# Patient Record
Sex: MALE | Race: BLACK OR AFRICAN AMERICAN | Employment: UNEMPLOYED
[De-identification: names, ages, dates, MRNs, and addresses within clinical notes are randomized per-mention and may not be internally consistent; named-entity substitution may affect disease eponyms.]

---

## 2017-01-01 ENCOUNTER — HOME CARE VISIT (OUTPATIENT)
Dept: SCHEDULING | Facility: HOME HEALTH | Age: 57
End: 2017-01-01
Payer: MEDICARE

## 2017-01-01 ENCOUNTER — HOME CARE VISIT (OUTPATIENT)
Dept: HOSPICE | Facility: HOSPICE | Age: 57
End: 2017-01-01
Payer: MEDICARE

## 2017-01-01 ENCOUNTER — HOSPICE CERTIFICATION ENCOUNTER (OUTPATIENT)
Dept: PALLATIVE CARE | Age: 57
End: 2017-01-01

## 2017-01-01 ENCOUNTER — HOSPITAL ENCOUNTER (INPATIENT)
Age: 57
LOS: 5 days | Discharge: HOSPICE | End: 2017-05-22
Attending: INTERNAL MEDICINE | Admitting: INTERNAL MEDICINE
Payer: MEDICARE

## 2017-01-01 ENCOUNTER — HOSPITAL ENCOUNTER (OUTPATIENT)
Dept: INTERVENTIONAL RADIOLOGY/VASCULAR | Age: 57
Discharge: HOME | End: 2017-01-04
Attending: INTERNAL MEDICINE | Admitting: INTERNAL MEDICINE
Payer: MEDICARE

## 2017-01-01 ENCOUNTER — NURSE NAVIGATOR (OUTPATIENT)
Dept: PALLATIVE CARE | Age: 57
End: 2017-01-01

## 2017-01-01 ENCOUNTER — HOSPITAL ENCOUNTER (INPATIENT)
Age: 57
LOS: 2 days | DRG: 951 | End: 2017-06-23
Attending: INTERNAL MEDICINE | Admitting: INTERNAL MEDICINE
Payer: OTHER MISCELLANEOUS

## 2017-01-01 ENCOUNTER — HOME CARE VISIT (OUTPATIENT)
Dept: HOME HEALTH SERVICES | Facility: HOME HEALTH | Age: 57
End: 2017-01-01

## 2017-01-01 VITALS
SYSTOLIC BLOOD PRESSURE: 133 MMHG | OXYGEN SATURATION: 96 % | RESPIRATION RATE: 21 BRPM | HEART RATE: 86 BPM | DIASTOLIC BLOOD PRESSURE: 96 MMHG

## 2017-01-01 VITALS — RESPIRATION RATE: 17 BRPM | HEART RATE: 74 BPM | DIASTOLIC BLOOD PRESSURE: 66 MMHG | SYSTOLIC BLOOD PRESSURE: 107 MMHG

## 2017-01-01 VITALS
RESPIRATION RATE: 20 BRPM | HEART RATE: 91 BPM | SYSTOLIC BLOOD PRESSURE: 112 MMHG | DIASTOLIC BLOOD PRESSURE: 50 MMHG | TEMPERATURE: 98 F

## 2017-01-01 VITALS
DIASTOLIC BLOOD PRESSURE: 75 MMHG | RESPIRATION RATE: 19 BRPM | HEART RATE: 63 BPM | OXYGEN SATURATION: 93 % | SYSTOLIC BLOOD PRESSURE: 102 MMHG

## 2017-01-01 VITALS
SYSTOLIC BLOOD PRESSURE: 95 MMHG | OXYGEN SATURATION: 94 % | DIASTOLIC BLOOD PRESSURE: 72 MMHG | HEART RATE: 68 BPM | SYSTOLIC BLOOD PRESSURE: 130 MMHG | RESPIRATION RATE: 18 BRPM | DIASTOLIC BLOOD PRESSURE: 69 MMHG | HEART RATE: 96 BPM

## 2017-01-01 VITALS
DIASTOLIC BLOOD PRESSURE: 93 MMHG | HEART RATE: 51 BPM | RESPIRATION RATE: 20 BRPM | SYSTOLIC BLOOD PRESSURE: 118 MMHG | OXYGEN SATURATION: 98 %

## 2017-01-01 VITALS
HEART RATE: 91 BPM | DIASTOLIC BLOOD PRESSURE: 52 MMHG | RESPIRATION RATE: 21 BRPM | OXYGEN SATURATION: 99 % | SYSTOLIC BLOOD PRESSURE: 110 MMHG

## 2017-01-01 VITALS
SYSTOLIC BLOOD PRESSURE: 131 MMHG | DIASTOLIC BLOOD PRESSURE: 80 MMHG | HEART RATE: 84 BPM | OXYGEN SATURATION: 96 % | RESPIRATION RATE: 19 BRPM

## 2017-01-01 VITALS
OXYGEN SATURATION: 95 % | DIASTOLIC BLOOD PRESSURE: 75 MMHG | HEART RATE: 93 BPM | RESPIRATION RATE: 16 BRPM | SYSTOLIC BLOOD PRESSURE: 105 MMHG

## 2017-01-01 VITALS
HEART RATE: 109 BPM | DIASTOLIC BLOOD PRESSURE: 54 MMHG | HEART RATE: 62 BPM | RESPIRATION RATE: 21 BRPM | RESPIRATION RATE: 20 BRPM | DIASTOLIC BLOOD PRESSURE: 55 MMHG | SYSTOLIC BLOOD PRESSURE: 93 MMHG | OXYGEN SATURATION: 94 % | OXYGEN SATURATION: 91 % | SYSTOLIC BLOOD PRESSURE: 98 MMHG

## 2017-01-01 VITALS
RESPIRATION RATE: 21 BRPM | DIASTOLIC BLOOD PRESSURE: 65 MMHG | OXYGEN SATURATION: 95 % | HEART RATE: 88 BPM | SYSTOLIC BLOOD PRESSURE: 139 MMHG

## 2017-01-01 VITALS
SYSTOLIC BLOOD PRESSURE: 130 MMHG | DIASTOLIC BLOOD PRESSURE: 89 MMHG | HEART RATE: 54 BPM | OXYGEN SATURATION: 91 % | RESPIRATION RATE: 12 BRPM

## 2017-01-01 VITALS
HEART RATE: 89 BPM | OXYGEN SATURATION: 98 % | TEMPERATURE: 98 F | SYSTOLIC BLOOD PRESSURE: 100 MMHG | RESPIRATION RATE: 21 BRPM | DIASTOLIC BLOOD PRESSURE: 69 MMHG

## 2017-01-01 VITALS — RESPIRATION RATE: 21 BRPM | DIASTOLIC BLOOD PRESSURE: 54 MMHG | SYSTOLIC BLOOD PRESSURE: 91 MMHG | HEART RATE: 70 BPM

## 2017-01-01 VITALS
HEART RATE: 77 BPM | SYSTOLIC BLOOD PRESSURE: 81 MMHG | RESPIRATION RATE: 20 BRPM | DIASTOLIC BLOOD PRESSURE: 55 MMHG | OXYGEN SATURATION: 97 %

## 2017-01-01 VITALS — SYSTOLIC BLOOD PRESSURE: 87 MMHG | HEART RATE: 70 BPM | RESPIRATION RATE: 22 BRPM | DIASTOLIC BLOOD PRESSURE: 54 MMHG

## 2017-01-01 VITALS
OXYGEN SATURATION: 97 % | SYSTOLIC BLOOD PRESSURE: 123 MMHG | RESPIRATION RATE: 20 BRPM | DIASTOLIC BLOOD PRESSURE: 88 MMHG | HEART RATE: 87 BPM

## 2017-01-01 VITALS — HEART RATE: 70 BPM | SYSTOLIC BLOOD PRESSURE: 90 MMHG | DIASTOLIC BLOOD PRESSURE: 62 MMHG | OXYGEN SATURATION: 98 %

## 2017-01-01 VITALS
SYSTOLIC BLOOD PRESSURE: 93 MMHG | HEART RATE: 75 BPM | OXYGEN SATURATION: 91 % | RESPIRATION RATE: 21 BRPM | DIASTOLIC BLOOD PRESSURE: 46 MMHG

## 2017-01-01 VITALS
TEMPERATURE: 98.8 F | RESPIRATION RATE: 18 BRPM | SYSTOLIC BLOOD PRESSURE: 91 MMHG | OXYGEN SATURATION: 90 % | HEART RATE: 100 BPM | DIASTOLIC BLOOD PRESSURE: 64 MMHG

## 2017-01-01 VITALS
SYSTOLIC BLOOD PRESSURE: 90 MMHG | OXYGEN SATURATION: 91 % | RESPIRATION RATE: 20 BRPM | DIASTOLIC BLOOD PRESSURE: 59 MMHG | HEART RATE: 84 BPM

## 2017-01-01 VITALS
OXYGEN SATURATION: 98 % | RESPIRATION RATE: 20 BRPM | TEMPERATURE: 98 F | SYSTOLIC BLOOD PRESSURE: 117 MMHG | DIASTOLIC BLOOD PRESSURE: 80 MMHG | HEART RATE: 92 BPM

## 2017-01-01 VITALS
RESPIRATION RATE: 21 BRPM | OXYGEN SATURATION: 87 % | HEART RATE: 78 BPM | DIASTOLIC BLOOD PRESSURE: 95 MMHG | SYSTOLIC BLOOD PRESSURE: 131 MMHG

## 2017-01-01 VITALS
HEART RATE: 58 BPM | RESPIRATION RATE: 18 BRPM | OXYGEN SATURATION: 97 % | TEMPERATURE: 98.2 F | SYSTOLIC BLOOD PRESSURE: 117 MMHG | DIASTOLIC BLOOD PRESSURE: 84 MMHG

## 2017-01-01 VITALS
RESPIRATION RATE: 20 BRPM | OXYGEN SATURATION: 93 % | DIASTOLIC BLOOD PRESSURE: 57 MMHG | HEART RATE: 71 BPM | SYSTOLIC BLOOD PRESSURE: 85 MMHG

## 2017-01-01 VITALS
DIASTOLIC BLOOD PRESSURE: 65 MMHG | SYSTOLIC BLOOD PRESSURE: 113 MMHG | OXYGEN SATURATION: 82 % | HEART RATE: 77 BPM | RESPIRATION RATE: 22 BRPM

## 2017-01-01 VITALS
SYSTOLIC BLOOD PRESSURE: 96 MMHG | RESPIRATION RATE: 13 BRPM | DIASTOLIC BLOOD PRESSURE: 58 MMHG | OXYGEN SATURATION: 96 % | HEART RATE: 113 BPM

## 2017-01-01 VITALS
SYSTOLIC BLOOD PRESSURE: 125 MMHG | DIASTOLIC BLOOD PRESSURE: 95 MMHG | OXYGEN SATURATION: 98 % | HEART RATE: 82 BPM | RESPIRATION RATE: 21 BRPM

## 2017-01-01 VITALS
DIASTOLIC BLOOD PRESSURE: 92 MMHG | OXYGEN SATURATION: 98 % | SYSTOLIC BLOOD PRESSURE: 111 MMHG | RESPIRATION RATE: 21 BRPM | HEART RATE: 75 BPM

## 2017-01-01 VITALS — DIASTOLIC BLOOD PRESSURE: 95 MMHG | SYSTOLIC BLOOD PRESSURE: 110 MMHG

## 2017-01-01 VITALS
HEART RATE: 80 BPM | SYSTOLIC BLOOD PRESSURE: 88 MMHG | DIASTOLIC BLOOD PRESSURE: 52 MMHG | OXYGEN SATURATION: 92 % | RESPIRATION RATE: 16 BRPM

## 2017-01-01 VITALS
OXYGEN SATURATION: 97 % | SYSTOLIC BLOOD PRESSURE: 106 MMHG | HEART RATE: 71 BPM | RESPIRATION RATE: 17 BRPM | DIASTOLIC BLOOD PRESSURE: 58 MMHG

## 2017-01-01 VITALS — DIASTOLIC BLOOD PRESSURE: 91 MMHG | RESPIRATION RATE: 20 BRPM | HEART RATE: 81 BPM | SYSTOLIC BLOOD PRESSURE: 129 MMHG

## 2017-01-01 VITALS
WEIGHT: 190 LBS | HEART RATE: 77 BPM | RESPIRATION RATE: 18 BRPM | BODY MASS INDEX: 25.73 KG/M2 | SYSTOLIC BLOOD PRESSURE: 93 MMHG | OXYGEN SATURATION: 99 % | HEIGHT: 72 IN | DIASTOLIC BLOOD PRESSURE: 74 MMHG | TEMPERATURE: 98.2 F

## 2017-01-01 VITALS
HEART RATE: 77 BPM | RESPIRATION RATE: 16 BRPM | SYSTOLIC BLOOD PRESSURE: 99 MMHG | DIASTOLIC BLOOD PRESSURE: 70 MMHG | OXYGEN SATURATION: 98 %

## 2017-01-01 VITALS — SYSTOLIC BLOOD PRESSURE: 89 MMHG | OXYGEN SATURATION: 93 % | DIASTOLIC BLOOD PRESSURE: 59 MMHG | HEART RATE: 59 BPM

## 2017-01-01 VITALS
HEART RATE: 85 BPM | OXYGEN SATURATION: 98 % | DIASTOLIC BLOOD PRESSURE: 98 MMHG | RESPIRATION RATE: 18 BRPM | TEMPERATURE: 98 F | SYSTOLIC BLOOD PRESSURE: 142 MMHG

## 2017-01-01 VITALS — DIASTOLIC BLOOD PRESSURE: 82 MMHG | HEART RATE: 66 BPM | RESPIRATION RATE: 12 BRPM | SYSTOLIC BLOOD PRESSURE: 110 MMHG

## 2017-01-01 VITALS
OXYGEN SATURATION: 92 % | DIASTOLIC BLOOD PRESSURE: 90 MMHG | RESPIRATION RATE: 24 BRPM | SYSTOLIC BLOOD PRESSURE: 131 MMHG | HEART RATE: 91 BPM

## 2017-01-01 VITALS — HEART RATE: 98 BPM | SYSTOLIC BLOOD PRESSURE: 85 MMHG | OXYGEN SATURATION: 98 % | DIASTOLIC BLOOD PRESSURE: 49 MMHG

## 2017-01-01 DIAGNOSIS — I25.9 ISCHEMIC HEART DISEASE DUE TO CORONARY ARTERY OBSTRUCTION (HCC): Primary | ICD-10-CM

## 2017-01-01 DIAGNOSIS — G89.4 CHRONIC PAIN DISORDER: ICD-10-CM

## 2017-01-01 DIAGNOSIS — I24.0 ISCHEMIC HEART DISEASE DUE TO CORONARY ARTERY OBSTRUCTION (HCC): Primary | ICD-10-CM

## 2017-01-01 DIAGNOSIS — I50.9 CHF (CONGESTIVE HEART FAILURE) (HCC): ICD-10-CM

## 2017-01-01 PROCEDURE — 0651 HSPC ROUTINE HOME CARE

## 2017-01-01 PROCEDURE — G0299 HHS/HOSPICE OF RN EA 15 MIN: HCPCS

## 2017-01-01 PROCEDURE — HOSPICE MEDICATION HC HH HOSPICE MEDICATION

## 2017-01-01 PROCEDURE — 74011636637 HC RX REV CODE- 636/637: Performed by: INTERNAL MEDICINE

## 2017-01-01 PROCEDURE — C1751 CATH, INF, PER/CENT/MIDLINE: HCPCS

## 2017-01-01 PROCEDURE — 74011250637 HC RX REV CODE- 250/637: Performed by: INTERNAL MEDICINE

## 2017-01-01 PROCEDURE — 77010033678 HC OXYGEN DAILY

## 2017-01-01 PROCEDURE — 0655 HSPC INPATIENT RESPITE

## 2017-01-01 PROCEDURE — G0155 HHCP-SVS OF CSW,EA 15 MIN: HCPCS

## 2017-01-01 PROCEDURE — 51798 US URINE CAPACITY MEASURE: CPT

## 2017-01-01 PROCEDURE — 74011000250 HC RX REV CODE- 250

## 2017-01-01 PROCEDURE — 36569 INSJ PICC 5 YR+ W/O IMAGING: CPT

## 2017-01-01 PROCEDURE — 65270000015 HC RM PRIVATE ONCOLOGY

## 2017-01-01 PROCEDURE — 77030002996 HC SUT SLK J&J -A

## 2017-01-01 PROCEDURE — 74011250637 HC RX REV CODE- 250/637: Performed by: NURSE PRACTITIONER

## 2017-01-01 PROCEDURE — 74011250636 HC RX REV CODE- 250/636

## 2017-01-01 PROCEDURE — 74011250636 HC RX REV CODE- 250/636: Performed by: INTERNAL MEDICINE

## 2017-01-01 RX ORDER — LORAZEPAM 1 MG/1
1 TABLET ORAL
Status: DISCONTINUED | OUTPATIENT
Start: 2017-01-01 | End: 2017-01-01 | Stop reason: HOSPADM

## 2017-01-01 RX ORDER — HYDROMORPHONE HYDROCHLORIDE 2 MG/ML
INJECTION, SOLUTION INTRAMUSCULAR; INTRAVENOUS; SUBCUTANEOUS
Status: DISPENSED
Start: 2017-01-01 | End: 2017-01-01

## 2017-01-01 RX ORDER — PREDNISONE 20 MG/1
20 TABLET ORAL DAILY
Qty: 30 TAB | Refills: 2 | Status: SHIPPED | OUTPATIENT
Start: 2017-01-01

## 2017-01-01 RX ORDER — OXYCODONE HCL 20 MG/ML
60 CONCENTRATE, ORAL ORAL
Status: DISCONTINUED | OUTPATIENT
Start: 2017-01-01 | End: 2017-01-01 | Stop reason: HOSPADM

## 2017-01-01 RX ORDER — LORAZEPAM 2 MG/ML
0.5 CONCENTRATE ORAL 2 TIMES DAILY
Status: CANCELLED | OUTPATIENT
Start: 2017-01-01

## 2017-01-01 RX ORDER — METHADONE HYDROCHLORIDE 5 MG/1
5 TABLET ORAL 3 TIMES DAILY
Status: DISCONTINUED | OUTPATIENT
Start: 2017-01-01 | End: 2017-01-01 | Stop reason: HOSPADM

## 2017-01-01 RX ORDER — LISINOPRIL 2.5 MG/1
2.5 TABLET ORAL DAILY
Status: DISCONTINUED | OUTPATIENT
Start: 2017-01-01 | End: 2017-01-01

## 2017-01-01 RX ORDER — FACIAL-BODY WIPES
10 EACH TOPICAL DAILY PRN
Status: DISCONTINUED | OUTPATIENT
Start: 2017-01-01 | End: 2017-01-01 | Stop reason: HOSPADM

## 2017-01-01 RX ORDER — METHADONE HYDROCHLORIDE 5 MG/1
5 TABLET ORAL 2 TIMES DAILY
Status: DISCONTINUED | OUTPATIENT
Start: 2017-01-01 | End: 2017-01-01

## 2017-01-01 RX ORDER — MORPHINE SULFATE 20 MG/ML
50 SOLUTION ORAL ONCE
Status: COMPLETED | OUTPATIENT
Start: 2017-01-01 | End: 2017-01-01

## 2017-01-01 RX ORDER — SERTRALINE HYDROCHLORIDE 50 MG/1
50 TABLET, FILM COATED ORAL DAILY
Status: DISCONTINUED | OUTPATIENT
Start: 2017-01-01 | End: 2017-01-01 | Stop reason: HOSPADM

## 2017-01-01 RX ORDER — BUMETANIDE 1 MG/1
2 TABLET ORAL DAILY
Status: DISCONTINUED | OUTPATIENT
Start: 2017-01-01 | End: 2017-01-01

## 2017-01-01 RX ORDER — DIGOXIN 125 MCG
0.25 TABLET ORAL DAILY
Status: DISCONTINUED | OUTPATIENT
Start: 2017-01-01 | End: 2017-01-01 | Stop reason: HOSPADM

## 2017-01-01 RX ORDER — LORAZEPAM 0.5 MG/1
0.5 TABLET ORAL EVERY 12 HOURS
Status: DISCONTINUED | OUTPATIENT
Start: 2017-01-01 | End: 2017-01-01 | Stop reason: HOSPADM

## 2017-01-01 RX ORDER — GUAIFENESIN 100 MG/5ML
81 LIQUID (ML) ORAL DAILY
Status: DISCONTINUED | OUTPATIENT
Start: 2017-01-01 | End: 2017-01-01 | Stop reason: HOSPADM

## 2017-01-01 RX ORDER — OXYCODONE HYDROCHLORIDE 30 MG/1
60 TABLET ORAL EVERY 4 HOURS
Status: DISCONTINUED | OUTPATIENT
Start: 2017-01-01 | End: 2017-01-01 | Stop reason: HOSPADM

## 2017-01-01 RX ORDER — DIGOXIN 125 MCG
250 TABLET ORAL DAILY
Status: DISCONTINUED | OUTPATIENT
Start: 2017-01-01 | End: 2017-01-01 | Stop reason: HOSPADM

## 2017-01-01 RX ORDER — LORAZEPAM 2 MG/ML
1 CONCENTRATE ORAL
Status: DISCONTINUED | OUTPATIENT
Start: 2017-01-01 | End: 2017-01-01 | Stop reason: HOSPADM

## 2017-01-01 RX ORDER — TAMSULOSIN HYDROCHLORIDE 0.4 MG/1
0.4 CAPSULE ORAL DAILY
Status: DISCONTINUED | OUTPATIENT
Start: 2017-01-01 | End: 2017-01-01 | Stop reason: HOSPADM

## 2017-01-01 RX ORDER — ACETAMINOPHEN 650 MG/1
650 SUPPOSITORY RECTAL
Status: DISCONTINUED | OUTPATIENT
Start: 2017-01-01 | End: 2017-01-01 | Stop reason: HOSPADM

## 2017-01-01 RX ORDER — DEXTROMETHORPHAN POLISTIREX 30 MG/5 ML
SUSPENSION, EXTENDED RELEASE 12 HR ORAL AS NEEDED
Status: DISCONTINUED | OUTPATIENT
Start: 2017-01-01 | End: 2017-01-01 | Stop reason: HOSPADM

## 2017-01-01 RX ORDER — LISINOPRIL 5 MG/1
2.5 TABLET ORAL DAILY
Status: DISCONTINUED | OUTPATIENT
Start: 2017-01-01 | End: 2017-01-01 | Stop reason: SDUPTHER

## 2017-01-01 RX ORDER — HYDROMORPHONE HYDROCHLORIDE 2 MG/1
2 TABLET ORAL
Status: DISCONTINUED | OUTPATIENT
Start: 2017-01-01 | End: 2017-01-01 | Stop reason: HOSPADM

## 2017-01-01 RX ORDER — MORPHINE SULFATE 20 MG/ML
10 SOLUTION ORAL
Status: DISCONTINUED | OUTPATIENT
Start: 2017-01-01 | End: 2017-01-01

## 2017-01-01 RX ORDER — OXYCODONE HYDROCHLORIDE 30 MG/1
30 TABLET ORAL
Status: DISCONTINUED | OUTPATIENT
Start: 2017-01-01 | End: 2017-01-01

## 2017-01-01 RX ORDER — CARVEDILOL 3.12 MG/1
3.12 TABLET ORAL 2 TIMES DAILY WITH MEALS
Status: DISCONTINUED | OUTPATIENT
Start: 2017-01-01 | End: 2017-01-01

## 2017-01-01 RX ORDER — NITROGLYCERIN 0.4 MG/1
0.4 TABLET SUBLINGUAL AS NEEDED
Status: DISCONTINUED | OUTPATIENT
Start: 2017-01-01 | End: 2017-01-01 | Stop reason: HOSPADM

## 2017-01-01 RX ORDER — AMOXICILLIN 250 MG
2 CAPSULE ORAL 2 TIMES DAILY
Status: DISCONTINUED | OUTPATIENT
Start: 2017-01-01 | End: 2017-01-01 | Stop reason: HOSPADM

## 2017-01-01 RX ORDER — LORAZEPAM 2 MG/ML
0.5 CONCENTRATE ORAL EVERY 6 HOURS
Status: DISCONTINUED | OUTPATIENT
Start: 2017-01-01 | End: 2017-01-01 | Stop reason: HOSPADM

## 2017-01-01 RX ORDER — MORPHINE SULFATE 20 MG/ML
60 SOLUTION ORAL
Status: DISCONTINUED | OUTPATIENT
Start: 2017-01-01 | End: 2017-01-01

## 2017-01-01 RX ORDER — ONDANSETRON 4 MG/1
4 TABLET, ORALLY DISINTEGRATING ORAL
Status: DISCONTINUED | OUTPATIENT
Start: 2017-01-01 | End: 2017-01-01 | Stop reason: HOSPADM

## 2017-01-01 RX ORDER — BUMETANIDE 1 MG/1
1 TABLET ORAL 2 TIMES DAILY
Status: DISCONTINUED | OUTPATIENT
Start: 2017-01-01 | End: 2017-01-01 | Stop reason: HOSPADM

## 2017-01-01 RX ORDER — PREDNISONE 10 MG/1
20 TABLET ORAL
Status: DISCONTINUED | OUTPATIENT
Start: 2017-01-01 | End: 2017-01-01 | Stop reason: HOSPADM

## 2017-01-01 RX ORDER — OXYCODONE HYDROCHLORIDE 5 MG/1
30 TABLET ORAL
Status: DISCONTINUED | OUTPATIENT
Start: 2017-01-01 | End: 2017-01-01 | Stop reason: SDUPTHER

## 2017-01-01 RX ORDER — SULINDAC 150 MG/1
150 TABLET ORAL 2 TIMES DAILY WITH MEALS
Status: DISCONTINUED | OUTPATIENT
Start: 2017-01-01 | End: 2017-01-01

## 2017-01-01 RX ORDER — SULINDAC 200 MG/1
200 TABLET ORAL 2 TIMES DAILY WITH MEALS
Status: DISCONTINUED | OUTPATIENT
Start: 2017-01-01 | End: 2017-01-01 | Stop reason: HOSPADM

## 2017-01-01 RX ORDER — LIDOCAINE HYDROCHLORIDE 20 MG/ML
INJECTION, SOLUTION INFILTRATION; PERINEURAL
Status: COMPLETED
Start: 2017-01-01 | End: 2017-01-01

## 2017-01-01 RX ADMIN — MINERAL OIL: 100 ENEMA RECTAL at 08:25

## 2017-01-01 RX ADMIN — DIGOXIN 0.25 MG: 125 TABLET ORAL at 08:49

## 2017-01-01 RX ADMIN — SULINDAC 200 MG: 200 TABLET ORAL at 17:28

## 2017-01-01 RX ADMIN — ASPIRIN 81 MG: 81 TABLET, CHEWABLE ORAL at 08:49

## 2017-01-01 RX ADMIN — OXYCODONE HYDROCHLORIDE 60 MG: 100 SOLUTION ORAL at 09:49

## 2017-01-01 RX ADMIN — OXYCODONE HYDROCHLORIDE 60 MG: 30 TABLET ORAL at 14:30

## 2017-01-01 RX ADMIN — OXYCODONE HYDROCHLORIDE 60 MG: 30 TABLET ORAL at 14:58

## 2017-01-01 RX ADMIN — OXYCODONE HYDROCHLORIDE 60 MG: 30 TABLET ORAL at 14:00

## 2017-01-01 RX ADMIN — METHADONE HYDROCHLORIDE 5 MG: 5 TABLET ORAL at 17:06

## 2017-01-01 RX ADMIN — METHADONE HYDROCHLORIDE 5 MG: 5 TABLET ORAL at 08:50

## 2017-01-01 RX ADMIN — OXYCODONE HYDROCHLORIDE 60 MG: 30 TABLET ORAL at 23:00

## 2017-01-01 RX ADMIN — LORAZEPAM 0.5 MG: 2 SOLUTION, CONCENTRATE ORAL at 23:32

## 2017-01-01 RX ADMIN — OXYCODONE HYDROCHLORIDE 60 MG: 100 SOLUTION ORAL at 23:31

## 2017-01-01 RX ADMIN — OXYCODONE HYDROCHLORIDE 60 MG: 30 TABLET ORAL at 03:30

## 2017-01-01 RX ADMIN — TAMSULOSIN HYDROCHLORIDE 0.4 MG: 0.4 CAPSULE ORAL at 09:15

## 2017-01-01 RX ADMIN — SULINDAC 200 MG: 200 TABLET ORAL at 17:57

## 2017-01-01 RX ADMIN — OXYCODONE HYDROCHLORIDE 60 MG: 30 TABLET ORAL at 07:00

## 2017-01-01 RX ADMIN — TAMSULOSIN HYDROCHLORIDE 0.4 MG: 0.4 CAPSULE ORAL at 08:49

## 2017-01-01 RX ADMIN — ASPIRIN 81 MG: 81 TABLET, CHEWABLE ORAL at 08:48

## 2017-01-01 RX ADMIN — TAMSULOSIN HYDROCHLORIDE 0.4 MG: 0.4 CAPSULE ORAL at 08:15

## 2017-01-01 RX ADMIN — LORAZEPAM 0.5 MG: 2 SOLUTION, CONCENTRATE ORAL at 00:56

## 2017-01-01 RX ADMIN — SULINDAC 200 MG: 200 TABLET ORAL at 08:25

## 2017-01-01 RX ADMIN — STANDARDIZED SENNA CONCENTRATE AND DOCUSATE SODIUM 2 TABLET: 8.6; 5 TABLET, FILM COATED ORAL at 17:43

## 2017-01-01 RX ADMIN — OXYCODONE HYDROCHLORIDE 60 MG: 30 TABLET ORAL at 16:42

## 2017-01-01 RX ADMIN — LORAZEPAM 0.5 MG: 2 SOLUTION, CONCENTRATE ORAL at 05:23

## 2017-01-01 RX ADMIN — CARVEDILOL 3.12 MG: 3.12 TABLET, FILM COATED ORAL at 08:48

## 2017-01-01 RX ADMIN — SERTRALINE HYDROCHLORIDE 50 MG: 50 TABLET ORAL at 08:12

## 2017-01-01 RX ADMIN — BUMETANIDE 2 MG: 1 TABLET ORAL at 08:49

## 2017-01-01 RX ADMIN — PREDNISONE 20 MG: 10 TABLET ORAL at 08:00

## 2017-01-01 RX ADMIN — OXYCODONE HYDROCHLORIDE 60 MG: 30 TABLET ORAL at 03:00

## 2017-01-01 RX ADMIN — METHADONE HYDROCHLORIDE 5 MG: 5 TABLET ORAL at 17:53

## 2017-01-01 RX ADMIN — LORAZEPAM 0.5 MG: 0.5 TABLET ORAL at 08:12

## 2017-01-01 RX ADMIN — DIGOXIN 0.25 MG: 125 TABLET ORAL at 08:14

## 2017-01-01 RX ADMIN — MORPHINE SULFATE 50 MG: 20 SOLUTION ORAL at 11:04

## 2017-01-01 RX ADMIN — ONDANSETRON 4 MG: 4 TABLET, ORALLY DISINTEGRATING ORAL at 23:20

## 2017-01-01 RX ADMIN — SULINDAC 200 MG: 200 TABLET ORAL at 17:06

## 2017-01-01 RX ADMIN — METHADONE HYDROCHLORIDE 5 MG: 5 TABLET ORAL at 17:57

## 2017-01-01 RX ADMIN — LORAZEPAM 0.5 MG: 2 SOLUTION, CONCENTRATE ORAL at 11:10

## 2017-01-01 RX ADMIN — OXYCODONE HYDROCHLORIDE 60 MG: 30 TABLET ORAL at 10:21

## 2017-01-01 RX ADMIN — MORPHINE SULFATE 10 MG: 20 SOLUTION ORAL at 13:29

## 2017-01-01 RX ADMIN — SULINDAC 200 MG: 200 TABLET ORAL at 09:16

## 2017-01-01 RX ADMIN — LISINOPRIL 2.5 MG: 5 TABLET ORAL at 08:49

## 2017-01-01 RX ADMIN — OXYCODONE HYDROCHLORIDE 60 MG: 30 TABLET ORAL at 06:59

## 2017-01-01 RX ADMIN — SULINDAC 200 MG: 200 TABLET ORAL at 17:44

## 2017-01-01 RX ADMIN — OXYCODONE HYDROCHLORIDE 60 MG: 30 TABLET ORAL at 12:10

## 2017-01-01 RX ADMIN — LIDOCAINE HYDROCHLORIDE: 20 INJECTION, SOLUTION INFILTRATION; PERINEURAL at 08:00

## 2017-01-01 RX ADMIN — SULINDAC 200 MG: 200 TABLET ORAL at 08:56

## 2017-01-01 RX ADMIN — CARVEDILOL 3.12 MG: 3.12 TABLET, FILM COATED ORAL at 17:53

## 2017-01-01 RX ADMIN — PREDNISONE 20 MG: 10 TABLET ORAL at 08:51

## 2017-01-01 RX ADMIN — DIGOXIN 0.25 MG: 125 TABLET ORAL at 08:16

## 2017-01-01 RX ADMIN — STANDARDIZED SENNA CONCENTRATE AND DOCUSATE SODIUM 2 TABLET: 8.6; 5 TABLET, FILM COATED ORAL at 17:05

## 2017-01-01 RX ADMIN — LORAZEPAM 1 MG: 1 TABLET ORAL at 08:33

## 2017-01-01 RX ADMIN — STANDARDIZED SENNA CONCENTRATE AND DOCUSATE SODIUM 2 TABLET: 8.6; 5 TABLET, FILM COATED ORAL at 09:00

## 2017-01-01 RX ADMIN — ACETAMINOPHEN 650 MG: 650 SUPPOSITORY RECTAL at 09:49

## 2017-01-01 RX ADMIN — PREDNISONE 20 MG: 10 TABLET ORAL at 08:26

## 2017-01-01 RX ADMIN — LORAZEPAM 0.5 MG: 0.5 TABLET ORAL at 08:50

## 2017-01-01 RX ADMIN — CARVEDILOL 3.12 MG: 3.12 TABLET, FILM COATED ORAL at 17:30

## 2017-01-01 RX ADMIN — OXYCODONE HYDROCHLORIDE 60 MG: 30 TABLET ORAL at 19:00

## 2017-01-01 RX ADMIN — PREDNISONE 20 MG: 10 TABLET ORAL at 08:15

## 2017-01-01 RX ADMIN — METHADONE HYDROCHLORIDE 5 MG: 5 TABLET ORAL at 21:23

## 2017-01-01 RX ADMIN — METHADONE HYDROCHLORIDE 5 MG: 5 TABLET ORAL at 08:48

## 2017-01-01 RX ADMIN — ONDANSETRON 4 MG: 4 TABLET, ORALLY DISINTEGRATING ORAL at 09:08

## 2017-01-01 RX ADMIN — TAMSULOSIN HYDROCHLORIDE 0.4 MG: 0.4 CAPSULE ORAL at 08:55

## 2017-01-01 RX ADMIN — SERTRALINE HYDROCHLORIDE 50 MG: 50 TABLET ORAL at 08:19

## 2017-01-01 RX ADMIN — SULINDAC 200 MG: 200 TABLET ORAL at 08:13

## 2017-01-01 RX ADMIN — OXYCODONE HYDROCHLORIDE 30 MG: 5 TABLET ORAL at 23:42

## 2017-01-01 RX ADMIN — TAMSULOSIN HYDROCHLORIDE 0.4 MG: 0.4 CAPSULE ORAL at 08:11

## 2017-01-01 RX ADMIN — OXYCODONE HYDROCHLORIDE 30 MG: 5 TABLET ORAL at 05:26

## 2017-01-01 RX ADMIN — SERTRALINE HYDROCHLORIDE 50 MG: 50 TABLET ORAL at 09:16

## 2017-01-01 RX ADMIN — LORAZEPAM 0.5 MG: 0.5 TABLET ORAL at 20:50

## 2017-01-01 RX ADMIN — BISACODYL 10 MG: 10 SUPPOSITORY RECTAL at 17:45

## 2017-01-01 RX ADMIN — STANDARDIZED SENNA CONCENTRATE AND DOCUSATE SODIUM 2 TABLET: 8.6; 5 TABLET, FILM COATED ORAL at 08:15

## 2017-01-01 RX ADMIN — CARVEDILOL 3.12 MG: 3.12 TABLET, FILM COATED ORAL at 17:57

## 2017-01-01 RX ADMIN — PREDNISONE 20 MG: 10 TABLET ORAL at 09:14

## 2017-01-01 RX ADMIN — HYDROMORPHONE HYDROCHLORIDE 1 MG/HR: 2 INJECTION INTRAMUSCULAR; INTRAVENOUS; SUBCUTANEOUS at 20:18

## 2017-01-01 RX ADMIN — ASPIRIN 81 MG: 81 TABLET, CHEWABLE ORAL at 08:24

## 2017-01-01 RX ADMIN — SERTRALINE HYDROCHLORIDE 50 MG: 50 TABLET ORAL at 09:00

## 2017-01-01 RX ADMIN — METHADONE HYDROCHLORIDE 5 MG: 5 TABLET ORAL at 08:14

## 2017-01-01 RX ADMIN — SODIUM BICARBONATE: 0.2 INJECTION, SOLUTION INTRAVENOUS at 08:00

## 2017-01-01 RX ADMIN — ASPIRIN 81 MG: 81 TABLET, CHEWABLE ORAL at 09:13

## 2017-01-01 RX ADMIN — SERTRALINE HYDROCHLORIDE 50 MG: 50 TABLET ORAL at 08:55

## 2017-01-01 RX ADMIN — OXYCODONE HYDROCHLORIDE 60 MG: 30 TABLET ORAL at 23:21

## 2017-01-01 RX ADMIN — OXYCODONE HYDROCHLORIDE 60 MG: 30 TABLET ORAL at 14:18

## 2017-01-01 RX ADMIN — MORPHINE SULFATE 10 MG: 20 SOLUTION ORAL at 09:52

## 2017-01-01 RX ADMIN — OXYCODONE HYDROCHLORIDE 60 MG: 30 TABLET ORAL at 11:46

## 2017-01-01 RX ADMIN — OXYCODONE HYDROCHLORIDE 60 MG: 100 SOLUTION ORAL at 05:23

## 2017-01-01 RX ADMIN — ASPIRIN 81 MG: 81 TABLET, CHEWABLE ORAL at 08:14

## 2017-01-04 NOTE — PROGRESS NOTES
Pt to IR for PICC Placement. 1095- Procedure complete. Pt tolerated well. NEw Right PICC Placed without incidence. 42 CM Internal Insertion length. 5626- Pt and wife provided with d/c instructions both able to verbalize understanding. Pt d/c via ambulance stretcher accompanied by wife. Pricila Sanchez RN

## 2017-01-04 NOTE — IP AVS SNAPSHOT
[address] 
[phone] Patient: Artemio Mccracken MRN: [id number] ZMB:0/49/0588 You are allergic to the following Allergen Reactions Amoxicillin Nausea and Vomiting Morphine Other (comments) Pt states when he got the pill form of morphine, he \"couldn't move\" Recent Documentation Height Weight BMI Smoking Status 1.829 m 86.2 kg 25.77 kg/m2 Former Smoker Emergency Contacts Name Discharge Info Relation Home Work Mobile Joseph Wong  Spouse [3] [phone] About your hospitalization You were admitted on:  January 4, 2017 You last received care in the:  [facility] RAD ANGIO IR You were discharged on:  January 4, 2017 Unit phone number:  [phone] Why you were hospitalized Your primary diagnosis was:  Not on File Providers Seen During Your Hospitalizations Provider Role Specialty Primary office phone Mckinley Saba MD Attending Provider Palliative Medicine [phone] Your Primary Care Physician (PCP) Primary Care Physician Office Phone Office Fax Damien Aranda [phone] Follow-up Information None Your Appointments Thursday January 05, 2017 To Be Determined HOSICE AIDE VISIT with Nicole Grant 39 (1 [facility]") Gisele 39 (1 [facility]") Friday January 06, 2017 To Be Determined SN HOSPICE VISIT with PAUL Strickland 39 (1 \[facility]") Gisele 39 (1 \[facility]") Current Discharge Medication List  
  
ASK your doctor about these medications Dose & Instructions Dispensing Information Comments Morning Noon Evening Bedtime albuterol 2.5 mg /3 mL (0.083 %) nebulizer solution Commonly known as:  PROVENTIL VENTOLIN Your next dose is: Today, Tomorrow Other:  _________ Dose:  2.5 mg  
3 mL by Nebulization route every four (4) hours as needed for Wheezing. Quantity:  60 Vial  
Refills:  1  
     
   
   
   
  
 albuterol-ipratropium 2.5 mg-0.5 mg/3 ml Nebu Commonly known as:  Gabriel Austin Your next dose is: Today, Tomorrow Other:  _________ Dose:  3 mL  
3 mL by Nebulization route every four (4) hours as needed (shortness of breath, respiratory distress, wheezing). Refills:  0  
     
   
   
   
  
 aspirin 81 mg chewable tablet Your next dose is: Today, Tomorrow Other:  _________ Dose:  81 mg Take 1 Tab by mouth daily. Quantity:  30 Tab Refills:  2  
     
   
   
   
  
 bumetanide 1 mg tablet Commonly known as:  Leela Lindquist Your next dose is: Today, Tomorrow Other:  _________ Dose:  1 mg Take 1 Tab by mouth two (2) times a day. Quantity:  120 Tab Refills:  2  
     
   
   
   
  
 carvedilol 3.125 mg tablet Commonly known as:  Julián Peat Your next dose is: Today, Tomorrow Other:  _________ Dose:  3.125 mg Take 1 Tab by mouth two (2) times daily (with meals). Quantity:  60 Tab Refills:  11  
     
   
   
   
  
 clonazePAM 1 mg tablet Commonly known as:  Aida Slay Your next dose is: Today, Tomorrow Other:  _________ Dose:  1 mg Take 1 mg by mouth two (2) times a day. Refills:  0  
     
   
   
   
  
 clotrimazole 1 % topical cream  
Commonly known as:  Finis Ast Your next dose is: Today, Tomorrow Other:  _________ Apply 1 thin layer to toenails twice daily Quantity:  15 g Refills:  0  
     
   
   
   
  
 CO Q-10 200 mg capsule Generic drug:  coenzyme Q-10 Your next dose is: Today, Tomorrow Other:  _________ Dose:  200 mg Take 200 mg by mouth daily. Refills:  0  
     
   
   
   
  
 digoxin 0.125 mg tablet Commonly known as:  [id number] Your next dose is: Today, Tomorrow Other:  _________ Dose:  0.25 mg Take 2 Tabs by mouth daily. Quantity:  60 Tab Refills:  2 HEPARIN FLUSH IV Your next dose is: Today, Tomorrow Other:  _________ Dose:  5 mL  
5 mL by Peripherally Inserted Central Catheter route as needed (per bag change and as needed). Refills:  0 HYDROmorphone (PF) 2 mg/mL injection Commonly known as:  DILAUDID Your next dose is: Today, Tomorrow Other:  _________ Dose:  1 mg  
1 mg by IntraVENous route See Admin Instructions. hydromorphone inj. 200mg/100ml cassette for IV PCA via PICC line. 1mg/hr basal rate with 1mg q 6min as needed via PCA Refills:  0  
     
   
   
   
  
 lisinopril 5 mg tablet Commonly known as:  Mollie Ripa Your next dose is: Today, Tomorrow Other:  _________ Dose:  2.5 mg Take 2.5 mg by mouth daily. Refills:  0 LORazepam 2 mg/mL concentrated solution Commonly known as:  INTENSOL Your next dose is: Today, Tomorrow Other:  _________ Dose:  1 mg Take 1 mg by mouth every two (2) hours as needed for Anxiety. Indications: ANXIETY Refills:  0  
     
   
   
   
  
 mupirocin 2 % ointment Commonly known as:  [employer] Your next dose is: Today, Tomorrow Other:  _________ Apply to fingernails and nose twice a day for 7 days, repeat monthly Quantity:  22 g Refills:  0 Nebulizer & Compressor machine Your next dose is: Today, Tomorrow Other:  _________ For use with Albuterol Jet Nebulizer solution Quantity:  1 Each Refills:  0  
     
   
   
   
  
 nicotine 21 mg/24 hr  
 Commonly known as:  Raimundo Morton Your next dose is: Today, Tomorrow Other:  _________ Dose:  1 Patch 1 Patch by TransDERmal route daily for 30 days. Quantity:  30 Patch Refills:  0  
     
   
   
   
  
 NORMAL SALINE FLUSH INJECTION Your next dose is: Today, Tomorrow Other:  _________ Dose:  10 mL 10 mL by Peripherally Inserted Central Catheter route as needed (per bag change and as needed). Refills:  0 Omega-3-DHA-EPA-Fish Oil 1,000 mg (120 mg-180 mg) Cap Your next dose is: Today, Tomorrow Other:  _________ Dose:  2 Tab Take 2 Tabs by mouth daily. Quantity:  60 Cap Refills:  0  
     
   
   
   
  
 ondansetron 4 mg disintegrating tablet Commonly known as:  ZOFRAN ODT Your next dose is: Today, Tomorrow Other:  _________ Dose:  4 mg Take 1 Tab by mouth every eight (8) hours as needed for Nausea. Quantity:  30 Tab Refills:  2  
     
   
   
   
  
 oxyCODONE IR 30 mg immediate release tablet Commonly known as:  OXY-IR Your next dose is: Today, Tomorrow Other:  _________ Dose:  90 mg Take 3 Tabs by mouth every four (4) hours as needed for Pain. Max Daily Amount: 540 mg. Oxycodone 30 mg two tablets every 4 hours as needed for pain  Indications: PAIN Quantity:  360 Tab Refills:  0 OXYGEN-AIR DELIVERY SYSTEMS Your next dose is: Today, Tomorrow Other:  _________ Dose:  2 L/min 2 L/min by Nasal route nightly as needed (uses at bedtime). Refills:  0  
     
   
   
   
  
 polyethylene glycol 17 gram packet Commonly known as:  Jefferson Elam Your next dose is: Today, Tomorrow Other:  _________ Dose:  17 g Take 1 Packet by mouth daily as needed. Indications: Constipation Quantity:  30 Packet Refills:  2  
     
   
   
   
  
 predniSONE 10 mg tablet Commonly known as:  Wu Uriarte Your next dose is: Today, Tomorrow Other:  _________ Dose:  10 mg Take 1 Tab by mouth daily (with breakfast). Quantity:  30 Tab Refills:  2 SENNA LAXATIVE-STOOL SOFTENER 8.6-50 mg per tablet Generic drug:  senna-docusate Your next dose is: Today, Tomorrow Other:  _________ Dose:  2 Tab Take 2 Tabs by mouth two (2) times a day. Refills:  0  
     
   
   
   
  
 tamsulosin 0.4 mg capsule Commonly known as:  FLOMAX Your next dose is: Today, Tomorrow Other:  _________ Dose:  0.4 mg Take 1 Cap by mouth daily. Quantity:  30 Cap Refills:  5 ZOLOFT 50 mg tablet Generic drug:  sertraline Your next dose is: Today, Tomorrow Other:  _________ Dose:  50 mg Take 50 mg by mouth daily. Refills:  0 Discharge Instructions [address] 
Special Procedures/Radiology Department RADIOLOGIST: Dr. [place] DATE: 1/4/2017 PICC Discharge Instructions Keep the dressing clean and dry. Do not remove the dressing. Watch for signs of infection: 
 Redness Fever/Chills Increased pain or tenderness at the site Drainage Continue your diet and medications Call your physician immediately if your arm continues to swell. Other: Have your physicians office call us to have PICC reinserted. Side effects of sedation medications and other medications used today have been reviewed. Notify us of nausea, itching, hives, dizziness, or anything else out of the ordinary. Closed Ended PICC Catheters: 
Flush Lumens as Follows: 
Intermittent Medication:   Flush before and after each medication with 10 ml NS. Unused Ports:  Flush every 8 hours with 10 ml NS. 
TPN Ports:  Flush every 24 hours with 20 ml NS prior to hanging new bag. Blood Draws: Stop infusion, draw off and waste 10 ml of blood. Draw sample and transfer with device to appropriate tubes. Flush with 20 ml NS. Dressing Change:  Every 7 days, and PRN using sterile technique if integrity of dressing is compromised. Initial dressing change for central line 24-48 hours post insertion if gauze is used. Apply new dressing per policy. Internal Insertion Length- 
External Insertion Length- 
PICC OK TO USE. Should you experience any of these significant changes, please call [phone] between the hours of 7:30 am and 10 pm or [phone] after hours. After hours, ask the  to page the [address] Technologist, and describe the problem to the technologist.  
 
 
 
 
 
Discharge Orders None Introducing [facility] SERVICES! Lamar He introduces [employer] patient portal. Now you can access parts of your medical record, email your doctor's office, and request medication refills online. 1. In your internet browser, go to [email or web address] 2. Click on the First Time User? Click Here link in the Sign In box. You will see the New Member Sign Up page. 3. Enter your [employer] Access Code exactly as it appears below. You will not need to use this code after youve completed the sign-up process. If you do not sign up before the expiration date, you must request a new code. · [employer] Access Code: [email or web address] Expires: 2/28/2017  2:26 PM 
 
4. Enter the last four digits of your Social Security Number (xxxx) and Date of Birth (mm/dd/yyyy) as indicated and click Submit. You will be taken to the next sign-up page. 5. Create a Invision[employer]comt ID. This will be your [employer] login ID and cannot be changed, so think of one that is secure and easy to remember. 6. Create a [employer] password. You can change your password at any time. 7. Enter your Password Reset Question and Answer. This can be used at a later time if you forget your password. 8. Enter your e-mail address. You will receive e-mail notification when new information is available in [address] 9. Click Sign Up. You can now view and download portions of your medical record. 10. Click the Download Summary menu link to download a portable copy of your medical information. If you have questions, please visit the Frequently Asked Questions section of the [employer] website. Remember, [employer] is NOT to be used for urgent needs. For medical emergencies, dial 911. Now available from your iPhone and Android! General Information Please provide this summary of care documentation to your next provider. Patient Signature:  ____________________________________________________________ Date:  ____________________________________________________________  
  
Aliza Rmck Provider Signature:  ____________________________________________________________ Date:  ____________________________________________________________

## 2017-01-04 NOTE — DISCHARGE INSTRUCTIONS
[address] [facility] [place] Procedures/Radiology Department      RADIOLOGIST: Dr. Lu Gallegos: 1/4/2017      PICC Discharge Instructions      Keep the dressing clean and dry. Do not remove the dressing. Watch for signs of infection:   Redness   Fever/Chills   Increased pain or tenderness at the site   Drainage    Continue your diet and medications    Call your physician immediately if your arm continues to swell. Other: Have your physicians office call us to have PICC reinserted. Side effects of sedation medications and other medications used today have been reviewed. Notify us of nausea, itching, hives, dizziness, or anything else out of the ordinary. Closed Ended PICC Catheters:  Flush Lumens as Follows:  Intermittent Medication:   Flush before and after each medication with 10 ml NS. Unused Ports:  Flush every 8 hours with 10 ml NS.  TPN Ports:  Flush every 24 hours with 20 ml NS prior to hanging new bag. Blood Draws: Stop infusion, draw off and waste 10 ml of blood. Draw sample and transfer with device to appropriate tubes. Flush with 20 ml NS. Dressing Change:  Every 7 days, and PRN using sterile technique if integrity of dressing is compromised. Initial dressing change for central line 24-48 hours post insertion if gauze is used. Apply new dressing per policy. Internal Insertion Length-  External Insertion Length-  PICC OK TO USE. Should you experience any of these significant changes, please call [phone] between the hours of 7:30 am and 10 pm or [phone] after hours.  After hours, ask the  to page the [address] [facility] Technologist, and describe the problem to the technologist.

## 2017-05-17 NOTE — H&P
[address] [facility] Help to Those in Need  [phone]    Patient Name: Moi Caro  YOB: 1960    Date of Provider Hospice Visit: 05/17/17    Level of Care:   [] General Inpatient (GIP)    [] Routine   [x] Respite    Location of Care:  [] [facility] [] [facility] [] [facility] [] [facility] [x] Hospice House [facility]  [] Home [] Other:      Date of Original Hospice Admission: 10-18-16  Hospice Attending: Dr Lennox Huntsman Diagnosis:  inclusion body myositis, cardiomyopathy, CAD, chronic systolic heart failure, Pulmonary HTN, essential HTN, PAF, EF 20%, CHF        HOSPICE NARRATIVE COMPOSED BY PHYSICIAN   Rationale for a prognosis of life expectancy of 6 months or less if the disease follows its normal course:    Moi Caro is a 60y.o. year old who was admitted to [facility] for respite Care.     Since admission to Hospice the patient's principle diagnosis of inclusion body myositis has resulted in cardiomyopathy with episodes of CHF, steroid dependency, and severe debility. the patient has demonstrated the following signs/symptoms of decline: inability to perform ADLs, profound weakness, ongoing pain, shortness of breath with low oxygen saturations and now with early stages of wounds due to his inability to move into a comfortable position, he is also having difficulty swallowing. and lower extremity contractures with severe chronic and acute pain syndrome. HOSPICE DIAGNOSES   Active Symptoms:  1. Generalized pain especially in the low spine and all over muscle pain  2. Shortness of breath  3. Weakness/debility     PLAN   1. Continue his pain regimen  2. Recently added methadone dosing 5mg 2 x day which he states is improving his pain overall    3.  and SW to support family needs  4.  Disposition: home after respite    Prognosis estimated based on 05/17/17 clinical assessment is:   [] Few to Many Hours  [] Few to Many Days    [x] Few to Many Weeks    [] Few to Many Months    Communicated plan of care with: Hospice Case Manager;  Hospice IDT; Care Team     GOALS OF CARE     Resuscitation Status: DNR  Durable DNR: [] Yes [] No unknown    Advance Care Planning 12/3/2016   Patient's Healthcare Decision Maker is: Named in scanned ACP document   Primary Decision Maker Name Naveed Lynn   Primary Decision Maker Phone Number [phone]   Primary Decision Maker Relationship to Patient Spouse   Confirm Advance Directive Yes, on file   Does the patient have other document types -        HISTORY     History obtained from: chart, pt, nursing    CHIEF COMPLAINT: generalized pain and weakness  The patient is:   [x] Verbal  [] Nonverbal  [] Unresponsive    HPI/SUBJECTIVE:  60year old male complaining of pain and weakness             FUNCTIONAL ASSESSMENT     Palliative Performance Scale (PPS): 30%     PSYCHOSOCIAL/SPIRITUAL ASSESSMENT     Active Problems:    Inclusion body myositis (6/14/2013)      Past Medical History:   Diagnosis Date    Autoimmune disease ([facility]     MD    CAD (coronary artery disease) 2012    CHF (congestive heart failure) ([facility]     Gastrointestinal disorder     Hypertension     Muscular dystrophy ([facility]     Neurological disorder     Inculsion body myocitis    Sleep disorder     Unspecified sleep apnea       Past Surgical History:   Procedure Laterality Date    CARDIAC SURG PROCEDURE UNLIST      stent cardiac cath    HX ORTHOPAEDIC      right knee    HX PACEMAKER      AL COLONOSCOPY FLX DX W/COLLJ SPEC WHEN PFRMD  12/12/2013         AL ESOPHAGOGASTRODUODENOSCOPY TRANSORAL DIAGNOSTIC  12/12/2013           Social History   Substance Use Topics    Smoking status: Former Smoker     Packs/day: 1.00     Years: 25.00     Types: Cigarettes    Smokeless tobacco: Never Used      Comment: trying to quit    Alcohol use No     Family History   Problem Relation Age of Onset    Heart Disease Other     Hypertension Father       Allergies   Allergen Reactions    Amoxicillin Nausea and Vomiting    Morphine Other (comments)     Pt states when he got the pill form of morphine, he \"couldn't move\"    3/3-Pt stated he is not allergic to morphine. He has requested to have liquids for increase shortness of breath. Per patient he does not have a true allergy he just did not know how to explain how the pills made him feel. Current Facility-Administered Medications   Medication Dose Route Frequency    ondansetron (ZOFRAN ODT) tablet 4 mg  4 mg Oral Q8H PRN    [START ON 5/18/2017] bumetanide (BUMEX) tablet 2 mg  2 mg Oral DAILY    [START ON 5/18/2017] tamsulosin (FLOMAX) capsule 0.4 mg  0.4 mg Oral DAILY    [START ON 5/18/2017] predniSONE (DELTASONE) tablet 20 mg  20 mg Oral DAILY WITH BREAKFAST    [START ON 5/18/2017] digoxin (LANOXIN) tablet 0.25 mg  250 mcg Oral DAILY    [START ON 5/18/2017] lisinopril (PRINIVIL, ZESTRIL) tablet 2.5 mg  2.5 mg Oral DAILY    methadone (DOLOPHINE) tablet 5 mg  5 mg Oral BID    carvedilol (COREG) tablet 3.125 mg  3.125 mg Oral BID WITH MEALS    oxyCODONE IR (ROXICODONE) tablet 30 mg  30 mg Oral Q4H PRN    [START ON 5/18/2017] sertraline (ZOLOFT) tablet 50 mg  50 mg Oral DAILY    [START ON 5/18/2017] aspirin chewable tablet 81 mg  81 mg Oral DAILY    nitroglycerin (NITROSTAT) tablet 0.4 mg  0.4 mg SubLINGual PRN    HYDROmorphone (DILAUDID) 2mg/mL PCA   IntraVENous CONTINUOUS    sulindac (CLINORIL) tablet 150 mg  150 mg Oral BID WITH MEALS        PHYSICAL EXAM     Wt Readings from Last 3 Encounters:   01/04/17 86.2 kg (190 lb)   12/07/16 86.2 kg (190 lb)   10/18/16 88.5 kg (195 lb)       There were no vitals taken for this visit.     Supplemental O2  [x] Yes  [] NO  Last bowel movement:     Currently this patient has:  [] Peripheral IV [] PICC  [] PORT [] ICD    [] Lam Catheter [] NG Tube   [] PEG Tube    [] Rectal Tube [] Drain  [] Other:     Constitutional: pt is awake, very weak, short of breath, skin very pale  Eyes: pupils equal, anicteric  ENMT: no nasal discharge, moist mucous membranes  Cardiovascular: regular rhythm, distal pulses intact  Respiratory: breathing not labored, symmetric  Gastrointestinal: soft non-tender, +bowel sounds  Musculoskeletal: no deformity, no tenderness to palpation  Skin: warm, dry  Neurologic: following commands, moving all extremities  Psychiatric: full affect, no hallucinations  Other:       Pertinent Lab and or Imaging Tests:  Lab Results   Component Value Date/Time    Sodium 140 09/30/2016 04:09 AM    Potassium 4.2 09/30/2016 04:09 AM    Chloride 104 09/30/2016 04:09 AM    CO2 30 09/30/2016 04:09 AM    Anion gap 6 09/30/2016 04:09 AM    Glucose 110 09/30/2016 04:09 AM    BUN 9 09/30/2016 04:09 AM    Creatinine 0.54 09/30/2016 04:09 AM    BUN/Creatinine ratio 17 09/30/2016 04:09 AM    GFR est AA >60 09/30/2016 04:09 AM    GFR est non-AA >60 09/30/2016 04:09 AM    Calcium 9.2 09/30/2016 04:09 AM     Lab Results   Component Value Date/Time    Protein, total 7.4 09/28/2016 07:06 AM    Albumin 3.4 09/28/2016 07:06 AM           Total time: 50  Counseling / coordination time: 35  > 50% counseling / coordination?: y      Angelica Roberto MD MD Addendum    Pt's chart reviewed, pt seen & case discussed with Ms. King Acosta. Agree with current care plan as outlined in her note. Pt's pain is not very well controlled. He complains of pain all over body mostly in the back and neck area. Some arthritic component in addition to myositis. Agree with methadone 5mg bid, continue oxycodone 30mg q4h prn pain  Recommend adding NSAID: sulindac 150mg bid to help reduce muscle inflammation and serve as adjunct for pain control. Will continue to follow for comfort and adjust medications/care plan accordingly.

## 2017-05-17 NOTE — PROGRESS NOTES
1445  Mr Glenna Ewing arrived at the [facility]. Pt was alert and oriented. Pt reported no pain at this time. Lungs clear. No shortness of breath noted. O2 at 3lpm.  + bowel sounds. Last reported BM was today. Pt is voiding without difficulty. Pt uses a urinal.  Pt has +1 edema in bilateral lower ext. Pt has a CADD pump - Dilaudid 2mg/ml, 1mg/hr with 1mg q 6 mins with max of 10/hr.    1600  Pt turned and repositioned. 1730  Appetite is poor. Pt tolerated bites and sips. No complaints at this time. 1800  Pt's family in to visit. No complaints at this time. 1900  Report given. NAME OF PATIENT:  Laura Pascal    LEVEL OF CARE:  Respite    REASON FOR RESPITE:  Caregiver breakdown and Caregiver cannot care for patient due to:  Exhaustion    O2 SAFETY:  Oxygen sign on the door    FALL INTERVENTIONS PROVIDED:   Implemented/recommended assistive devices and encouraged their use and Implemented/recommended resources for alarm system (personal alarm, bed alarm, call bell, etc.)     INTERDISPLINARY COMMUNICATION/COLLABORATION:  Physician, MSW, Garo and RN, CNA    NEW MEDICATION INITIATION DOCUMENTATION:  No new meds initiated at this time. COMFORTABLE DYING MEASURE:  Is Patient/family satisfied with symptom level?  yes    DISCHARGE PLAN:  Pt will return home and continue to be followed by Home Hospice.

## 2017-05-18 NOTE — PROGRESS NOTES
[place] [facility]  [phone]    Patient Name: Ines Diallo  YOB: 1960    Date of Provider Hospice Visit: 05/18/17    Level of Care:   [] General Inpatient (GIP)    [] Routine   [x] Respite    Location of Care:  [] [facility] [] [facility] [] [facility] [] [facility] [x] [facility]  [] Home [] Other:      Date of Original Hospice Admission: 10-18-16  Hospice Attending: Dr Aurora Munoz Diagnosis:  inclusion body myositis, cardiomyopathy, CAD, chronic systolic heart failure, Pulmonary HTN, essential HTN, PAF, EF 20%, CHF        HOSPICE NARRATIVE COMPOSED BY PHYSICIAN   Rationale for a prognosis of life expectancy of 6 months or less if the disease follows its normal course:    Ines Diallo is a 60y.o. year old who was admitted to [facility] for respite Care.     Since admission to Hospice the patient's principle diagnosis of inclusion body myositis has resulted in cardiomyopathy with episodes of CHF, steroid dependency, and severe debility. the patient has demonstrated the following signs/symptoms of decline: inability to perform ADLs, profound weakness, ongoing pain, shortness of breath with low oxygen saturations and now with early stages of wounds due to his inability to move into a comfortable position, he is also having difficulty swallowing. and lower extremity contractures with severe chronic and acute pain syndrome. HOSPICE DIAGNOSES   Active Symptoms:  1. Generalized pain especially in the low spine and all over muscle pain  2. Shortness of breath  3. Weakness/debility   4. Anxiety/Irritability     PLAN   1. Continue respite care  2. Continue methadone 5mg 2 x day: seems to be helping  3. Increase oxycodone to 60mg q4h scheduled; pt may refuse if he is not hurting  4. Continue PCA dilaudid for now; pt not using much; wean off gradaually  5. Add sulindac 200mg bid to help with antiinflammatory adjunct response.   6. Pt is quite depressed and stays anxious, continue zoloft, psychosocial support     7.  and SW to support family needs  8. Disposition: home after respite    Prognosis estimated based on 05/18/17 clinical assessment is:   [] Few to Many Hours  [] Few to Many Days    [x] Few to Many Weeks    [] Few to Many Months    Communicated plan of care with: Hospice Case Manager; Hospice IDT; Care Team     GOALS OF CARE     Resuscitation Status: DNR  Durable DNR: [x] Yes [] No unknown    Advance Care Planning 12/3/2016   Patient's Healthcare Decision Maker is: Named in scanned ACP document   Primary Decision Maker Name Valentin South   Primary Decision Maker Phone Number [phone]   Primary Decision Maker Relationship to Patient Spouse   Confirm Advance Directive Yes, on file   Does the patient have other document types -        HISTORY     History obtained from: chart, pt, nursing    CHIEF COMPLAINT: generalized pain and weakness  The patient is:   [x] Verbal  [] Nonverbal  [] Unresponsive    HPI/SUBJECTIVE:  60year old male complaining of pain and weakness. Pt seems to be irritable and unhappy, he apparently could not get his oxycodone IR overnight and he is in increased pain, has not been using the PCA much. At home he was apparently taking oxycodone IR 60mg on schedule every 4 hours except when he did not need it but his bottle from home said oxycodone 30mg q4h prn pain and therefore he was not getting it here automatically overnight unless he asks for it. Pt also had bout of regurgitation and vomiting this am when he tried to take all his pills together.               FUNCTIONAL ASSESSMENT     Palliative Performance Scale (PPS): 30%     PSYCHOSOCIAL/SPIRITUAL ASSESSMENT     Active Problems:    Inclusion body myositis (6/14/2013)      Past Medical History:   Diagnosis Date    Autoimmune disease ([facility] [address]     MD    CAD (coronary artery disease) 2012    [facility])     Gastrointestinal disorder     Hypertension     [facility]cular [facility] [address]     Neurological disorder     Inculsion body myocitis    Sleep disorder     Unspecified sleep apnea       Past Surgical History:   Procedure Laterality Date    CARDIAC SURG PROCEDURE UNLIST      stent cardiac cath    HX ORTHOPAEDIC      right knee    HX PACEMAKER      NV COLONOSCOPY FLX DX W/COLLJ SPEC WHEN PFRMD  12/12/2013         NV ESOPHAGOGASTRODUODENOSCOPY TRANSORAL DIAGNOSTIC  12/12/2013           Social History   Substance Use Topics    Smoking status: Former Smoker     Packs/day: 1.00     Years: 25.00     Types: Cigarettes    Smokeless tobacco: Never Used      Comment: trying to quit    Alcohol use No     Family History   Problem Relation Age of Onset    Heart Disease Other     Hypertension Father       Allergies   Allergen Reactions    Amoxicillin Nausea and Vomiting    Morphine Other (comments)     Pt states when he got the pill form of morphine, he \"couldn't move\"    3/3-Pt stated he is not allergic to morphine. He has requested to have liquids for increase shortness of breath. Per patient he does not have a true allergy he just did not know how to explain how the pills made him feel.        Current Facility-Administered Medications   Medication Dose Route Frequency    [START ON 5/19/2017] lisinopril (PRINIVIL, ZESTRIL) tablet 2.5 mg  2.5 mg Oral DAILY    sulindac (CLINORIL) tablet 200 mg  200 mg Oral BID WITH MEALS    oxyCODONE IR (OXY-IR) immediate release tablet 60 mg  60 mg Oral Q4H    ondansetron (ZOFRAN ODT) tablet 4 mg  4 mg Oral Q8H PRN    bumetanide (BUMEX) tablet 2 mg  2 mg Oral DAILY    tamsulosin (FLOMAX) capsule 0.4 mg  0.4 mg Oral DAILY    predniSONE (DELTASONE) tablet 20 mg  20 mg Oral DAILY WITH BREAKFAST    digoxin (LANOXIN) tablet 0.25 mg  250 mcg Oral DAILY    methadone (DOLOPHINE) tablet 5 mg  5 mg Oral BID    carvedilol (COREG) tablet 3.125 mg  3.125 mg Oral BID WITH MEALS    sertraline (ZOLOFT) tablet 50 mg  50 mg Oral DAILY    aspirin chewable tablet 81 mg  81 mg Oral DAILY    nitroglycerin (NITROSTAT) tablet 0.4 mg  0.4 mg SubLINGual PRN    HYDROmorphone (DILAUDID) 2mg/mL PCA   IntraVENous CONTINUOUS        PHYSICAL EXAM     Wt Readings from Last 3 Encounters:   01/04/17 86.2 kg (190 lb)   12/07/16 86.2 kg (190 lb)   10/18/16 88.5 kg (195 lb)       Visit Vitals    /64 (BP 1 Location: Left arm, BP Patient Position: At rest)    Pulse 63    Temp 98.7 °F (37.1 °C)    Resp 15    SpO2 97%       Supplemental O2  [x] Yes  [] NO  Last bowel movement:     Currently this patient has:  [] Peripheral IV [] PICC  [] PORT [] ICD    [] Lam Catheter [] NG Tube   [] PEG Tube    [] Rectal Tube [] Drain  [x] Other:  CADD pump    Constitutional: pt is anxious and somewhat irritable, very weak, short of breath, skin very pale  Eyes: pupils equal, anicteric  ENMT: no nasal discharge, moist mucous membranes  Cardiovascular: regular rhythm, distal pulses intact  Respiratory: breathing not labored, symmetric  Gastrointestinal: soft non-tender, +bowel sounds  Musculoskeletal: no deformity, no tenderness to palpation  Skin: warm, dry  Neurologic: following commands, moving all extremities  Psychiatric: anxious affect  Other:       Pertinent Lab and or Imaging Tests:  Lab Results   Component Value Date/Time    Sodium 140 09/30/2016 04:09 AM    Potassium 4.2 09/30/2016 04:09 AM    Chloride 104 09/30/2016 04:09 AM    CO2 30 09/30/2016 04:09 AM    Anion gap 6 09/30/2016 04:09 AM    Glucose 110 09/30/2016 04:09 AM    BUN 9 09/30/2016 04:09 AM    Creatinine 0.54 09/30/2016 04:09 AM    BUN/Creatinine ratio 17 09/30/2016 04:09 AM    GFR est AA >60 09/30/2016 04:09 AM    GFR est non-AA >60 09/30/2016 04:09 AM    Calcium 9.2 09/30/2016 04:09 AM     Lab Results   Component Value Date/Time    Protein, total 7.4 09/28/2016 07:06 AM    Albumin 3.4 09/28/2016 07:06 AM           Total time: 35mts  Counseling / coordination time:20mts discussing with pt and staff  > 50% counseling / coordination?: fern Kurtz MD

## 2017-05-18 NOTE — PROGRESS NOTES
Lisa Butler 139 report from [facility], patient admitted for respite care  1930 Called nurse to room asked for hand wipes and several other items, providied  1945 CADD pump cleared, patient has used PCA 1mg prn dose x 1 in past 12 hrs, pain assessed at 4/10 encouraged to use PCA Dilaudid  2030 Patient wanting to sit on side of bed although he is unstable sitting up  2100 Patient called out has fallen back in bed and needs repositoning  2230 Patient is still sitting on side of bed  2342 Patient of 20/10 pain angry because he was not given his oxycodone earlier however patient had not asked for it, apologized for miscommunication and explained what prn means  0030 Patient states ready to go back to bed assisted by CNA  0400 Assessed for pain 4 hrs after last dose, pt is sleeping soundly  0530 Pt woke up stated pain 9/10 requested Oxycodone 30mg given  0700 Report to oncoming shift    NAME OF PATIENT: Pooja Re     LEVEL OF CARE: Respite     REASON FOR RESPITE:  Caregiver breakdown and Caregiver cannot care for patient due to: Exhaustion     O2 SAFETY:  Oxygen sign on the door     FALL INTERVENTIONS PROVIDED:   Implemented/recommended assistive devices and encouraged their use and Implemented/recommended resources for alarm system (personal alarm, bed alarm, call bell, etc.)      INTERDISPLINARY COMMUNICATION/COLLABORATION:  Physician, MSW, [place] and RN, CNA     NEW MEDICATION INITIATION DOCUMENTATION: No new meds initiated at this time.      COMFORTABLE DYING MEASURE:  Is Patient/family satisfied with symptom level? yes     DISCHARGE PLAN: Pt will return home and continue to be followed by Home Hospice.

## 2017-05-18 NOTE — PROGRESS NOTES
07: 30:Report received, pt is alert and oriented times 4 resting quietly. 09:30:Pt is awake had complaints about changes with his schedule meds stated he was not given PRN oxycondone IR educated him on is ability to use his pump and let him know that he can have is PRN oxycodone. He did not tolerated is meds well small amt of emesis, zofran adm sl, pt is depressed, and talks about his illness a lot. Comfort with kind words. 11:30:Pt is resting quietly no changes encourage to use pump if he has pain. Pt denies pain at present. 13:30:Assist pt up at side of bed and setup his lunch, no complaints. 14:30:Pt became agitated in regards to his meds again stated he was in a lot of pain and no one to give him his meds inform him that I assess him all day and he denies pain. Requested his oxycodone, but is upset and wanted to speak to Dr Scot Schirmer to get clarity who met with him earlier but was not satisfied. 1500:Dr Scot Schirmer came and address pt and made changes to his oxycodone to accommodate his home regimen. Tolerated oxycodone per MD orders. 1700:Repositioned for comfort, dinner setup  1800: Adm schedule meds tolerated well.  1900:Report given to RN          NAME OF PATIENT:  Chiquita Almendarez    LEVEL OF CARE:  Respite  REASON FOR GIP: N/A      PATIENT REMAINS ELIGIBLE FOR GIP LEVEL OF CARE AS EVIDENCED BY: N/A    REASON FOR RESPITE:  Caregiver breakdown    O2 SAFETY:  Concentrator positioning (6\" from furniture/drapes), Tanks stored in [place] , No petroleum based products on face while oxygen in use and Oxygen sign on the door    FALL INTERVENTIONS PROVIDED:   Implemented/recommended use of non-skid footwear, Implemented/recommended use of fall risk identification flag to all team members, Implemented/recommended assistive devices and encouraged their use, Implemented/recommended resources for alarm system (personal alarm, bed alarm, call bell, etc.)  and Implemented/recommended environmental changes (remove hazards, lower bed, improve lighting, etc.)    INTERDISPLINARY COMMUNICATION/COLLABORATION:  Physician, MSW, [place] and RN, CNA    NEW MEDICATION INITIATION DOCUMENTATION:N/A      Reason medication is being initiated:  N/A    MD / Provider name consulted re: change in status / initiation of new medication:N/A    New Symptom(s):  N/A    New Order(s): N/A  Name of the person notified of the changes: N/A  Name of person being taught:  N/A  Instructions given: N/A  Side Effects taught:  N/A  Response to teaching:  N/A    COMFORTABLE DYING MEASURE:  Is Patient/family satisfied with symptom level?  yes    DISCHARGE PLAN:  Pt will complete 5 day respite and go home with family.

## 2017-05-19 NOTE — PROGRESS NOTES
[place] [facility]  [phone]    Patient Name: Emmy Alfredo  YOB: 1960    Date of Provider Hospice Visit: 05/19/17    Level of Care:   [] General Inpatient (GIP)    [] Routine   [x] Respite    Location of Care:  [] [facility] [] [facility] [] [facility] [] [facility] [x] [facility]  [] Home [] Other:      Date of Original Hospice Admission: 10-18-16  Hospice Attending: Dr Michael Gallo Diagnosis:  inclusion body myositis, cardiomyopathy, CAD, chronic systolic heart failure, Pulmonary HTN, essential HTN, PAF, EF 20%, CHF        HOSPICE NARRATIVE COMPOSED BY PHYSICIAN   Rationale for a prognosis of life expectancy of 6 months or less if the disease follows its normal course:    Emmy Alfredo is a 60y.o. year old who was admitted to [facility] for respite Care.     Since admission to Hospice the patient's principle diagnosis of inclusion body myositis has resulted in cardiomyopathy with episodes of CHF, steroid dependency, and severe debility. the patient has demonstrated the following signs/symptoms of decline: inability to perform ADLs, profound weakness, ongoing pain, shortness of breath with low oxygen saturations and now with early stages of wounds due to his inability to move into a comfortable position, he is also having difficulty swallowing. and lower extremity contractures with severe chronic and acute pain syndrome. HOSPICE DIAGNOSES   Active Symptoms:  1. Generalized pain especially in the low spine and all over muscle pain  2. Shortness of breath  3. Weakness/debility   4. Anxiety/Irritability     PLAN   1. Continue respite care  2. Continue methadone 5mg 2 x day: seems to be helping  3. Continue oxycodone 60mg q4h scheduled; pt may refuse if he is not hurting  4. Continue PCA dilaudid for now; pt not using much; wean off gradaually  5. Continue sulindac 200mg bid to help with antiinflammatory adjunct response.   6. Pt is quite depressed and stays anxious, continue zoloft, psychosocial support, schedule ativan 0.5mg bid to help with anxiety    7.  and SW to support family needs  8. Disposition: home after respite    Prognosis estimated based on 05/19/17 clinical assessment is:   [] Few to Many Hours  [] Few to Many Days    [x] Few to Many Weeks    [] Few to Many Months    Communicated plan of care with: Hospice Case Manager; Hospice IDT; Care Team     GOALS OF CARE     Resuscitation Status: DNR  Durable DNR: [x] Yes [] No unknown    Advance Care Planning 12/3/2016   Patient's Healthcare Decision Maker is: Named in scanned ACP document   Primary Decision Maker Name Anne Hernandez   Primary Decision Maker Phone Number [phone]   Primary Decision Maker Relationship to Patient Spouse   Confirm Advance Directive Yes, on file   Does the patient have other document types -        HISTORY     History obtained from: chart, pt, nursing    CHIEF COMPLAINT: generalized pain and weakness  The patient is:   [x] Verbal  [] Nonverbal  [] Unresponsive    HPI/SUBJECTIVE:  Pt seen resting comfortably and sleeping at this time but per nurse report he had another episode yesterday night where he became confused and paranoid about his meds and became upset/irriatble that he was not receiving his meds correctly. He has been very anxious as well and got one dose of ativan that seems to have helped some and now he is sleeping.         FUNCTIONAL ASSESSMENT     Palliative Performance Scale (PPS): 30%     PSYCHOSOCIAL/SPIRITUAL ASSESSMENT     Active Problems:    Inclusion body myositis (6/14/2013)      Past Medical History:   Diagnosis Date    Autoimmune disease ([facility] [address]     MD    CAD (coronary artery disease) 2012    [facility]     Gastrointestinal disorder     Hypertension     Muscular dystrophy ([facility] [address]     Neurological disorder     Inculsion body myocitis    Sleep disorder     Unspecified sleep apnea       Past Surgical History: Procedure Laterality Date    CARDIAC SURG PROCEDURE UNLIST      stent cardiac cath    HX ORTHOPAEDIC      right knee    HX PACEMAKER      NV COLONOSCOPY FLX DX W/COLLJ SPEC WHEN PFRMD  12/12/2013         NV ESOPHAGOGASTRODUODENOSCOPY TRANSORAL DIAGNOSTIC  12/12/2013           Social History   Substance Use Topics    Smoking status: Former Smoker     Packs/day: 1.00     Years: 25.00     Types: Cigarettes    Smokeless tobacco: Never Used      Comment: trying to quit    Alcohol use No     Family History   Problem Relation Age of Onset    Heart Disease Other     Hypertension Father       Allergies   Allergen Reactions    Amoxicillin Nausea and Vomiting    Morphine Other (comments)     Pt states when he got the pill form of morphine, he \"couldn't move\"    3/3-Pt stated he is not allergic to morphine. He has requested to have liquids for increase shortness of breath. Per patient he does not have a true allergy he just did not know how to explain how the pills made him feel.        Current Facility-Administered Medications   Medication Dose Route Frequency    LORazepam (ATIVAN) tablet 1 mg  1 mg Oral Q4H PRN    HYDROmorphone (PF) (DILAUDID) 2 mg/mL injection        LORazepam (ATIVAN) tablet 0.5 mg  0.5 mg Oral Q12H    lisinopril (PRINIVIL, ZESTRIL) tablet 2.5 mg  2.5 mg Oral DAILY    sulindac (CLINORIL) tablet 200 mg  200 mg Oral BID WITH MEALS    oxyCODONE IR (OXY-IR) immediate release tablet 60 mg  60 mg Oral Q4H    ondansetron (ZOFRAN ODT) tablet 4 mg  4 mg Oral Q8H PRN    bumetanide (BUMEX) tablet 2 mg  2 mg Oral DAILY    tamsulosin (FLOMAX) capsule 0.4 mg  0.4 mg Oral DAILY    predniSONE (DELTASONE) tablet 20 mg  20 mg Oral DAILY WITH BREAKFAST    digoxin (LANOXIN) tablet 0.25 mg  250 mcg Oral DAILY    methadone (DOLOPHINE) tablet 5 mg  5 mg Oral BID    carvedilol (COREG) tablet 3.125 mg  3.125 mg Oral BID WITH MEALS    sertraline (ZOLOFT) tablet 50 mg  50 mg Oral DAILY    aspirin chewable tablet 81 mg  81 mg Oral DAILY    nitroglycerin (NITROSTAT) tablet 0.4 mg  0.4 mg SubLINGual PRN    HYDROmorphone (DILAUDID) 2mg/mL PCA   IntraVENous CONTINUOUS        PHYSICAL EXAM     Wt Readings from Last 3 Encounters:   01/04/17 86.2 kg (190 lb)   12/07/16 86.2 kg (190 lb)   10/18/16 88.5 kg (195 lb)       Visit Vitals    BP 90/60 (BP 1 Location: Left arm)    Pulse 69    Temp 98 °F (36.7 °C)    Resp 20    SpO2 97%       Supplemental O2  [x] Yes  [] NO  Last bowel movement:     Currently this patient has:  [] Peripheral IV [] PICC  [] PORT [] ICD    [] Lam Catheter [] NG Tube   [] PEG Tube    [] Rectal Tube [] Drain  [x] Other:  CADD pump    Constitutional: pt is resting comfortably and sleeping, wakes up when called but could not answer questions.    Eyes: pupils equal, anicteric  ENMT: no nasal discharge, moist mucous membranes  Cardiovascular: regular rhythm, distal pulses intact  Respiratory: breathing not labored, symmetric  Gastrointestinal: soft non-tender, +bowel sounds  Musculoskeletal: no deformity, no tenderness to palpation  Skin: warm, dry  Neurologic: asleep, moving all extremities  Psychiatric: calm, asleep  Other:       Pertinent Lab and or Imaging Tests:  Lab Results   Component Value Date/Time    Sodium 140 09/30/2016 04:09 AM    Potassium 4.2 09/30/2016 04:09 AM    Chloride 104 09/30/2016 04:09 AM    CO2 30 09/30/2016 04:09 AM    Anion gap 6 09/30/2016 04:09 AM    Glucose 110 09/30/2016 04:09 AM    BUN 9 09/30/2016 04:09 AM    Creatinine 0.54 09/30/2016 04:09 AM    BUN/Creatinine ratio 17 09/30/2016 04:09 AM    GFR est AA >60 09/30/2016 04:09 AM    GFR est non-AA >60 09/30/2016 04:09 AM    Calcium 9.2 09/30/2016 04:09 AM     Lab Results   Component Value Date/Time    Protein, total 7.4 09/28/2016 07:06 AM    Albumin 3.4 09/28/2016 07:06 AM           Total time: 35mts  Counseling / coordination time:20mts discussing with pt and staff  > 50% counseling / coordination?: fern Nuñez Caleb Dillon MD

## 2017-05-19 NOTE — PROGRESS NOTES
NAME OF PATIENT:  Linda Renae    LEVEL OF CARE:  Respite    REASON FOR GIP:   n/a    *PATIENT REMAINS ELIGIBLE FOR [facility] LEVEL OF CARE AS EVIDENCED BY: (MUST BE ADDRESSED OF PATIENT GIP)      REASON FOR RESPITE:  Caregiver breakdown    O2 SAFETY:  Concentrator positioning (6\" from furniture/drapes), Tanks stored in [place] , No petroleum based products on face while oxygen in use and Oxygen sign on the door    FALL INTERVENTIONS PROVIDED:   Implemented/recommended use of non-skid footwear, Implemented/recommended use of fall risk identification flag to all team members, Implemented/recommended resources for alarm system (personal alarm, bed alarm, call bell, etc.) , Implemented/recommended environmental changes (remove hazards, lower bed, improve lighting, etc.) and Implemented/recommended increased supervision/assistance    INTERDISPLINARY COMMUNICATION/COLLABORATION:  Physician, MSW, [place] and RN, CNA    NEW MEDICATION INITIATION DOCUMENTATION:  n/a    Reason medication is being initiated:  n/a    MD / Provider name consulted re: change in status / initiation of new medication:  n/a    New Symptom(s):  n/a    New Order(s):  n/a    Name of the person notified of the changes:  n/a    Name of person being taught:  n/a    Instructions given:  n/a    Side Effects taught:  n/a    Response to teaching:  n/a      COMFORTABLE DYING MEASURE:  Is Patient/family satisfied with symptom level?  yes    DISCHARGE PLAN:  Once Respite is complete, patient will go home with [facility]. 0715:  Verbal shift change report given to Emiliano Quinn RN (oncoming nurse) by Edilberto Mello RN (offgoing nurse). Report included the following information SBAR, Kardex, Intake/Output and MAR.   0830:  Administered scheduled medications (see MAR); patient refused methadone. Patient showing s/s of agitation/restlessness. Suggested prn ativan; he agreed. Administered prn ativan 1mg/PO.   1020:  Reassessed agitation/restlessness; patient is more relaxed and no longer showing s/s of agitation nor restlessness. Will continue to monitor.

## 2017-05-19 NOTE — PROGRESS NOTES
[facility] Follow-up Visit        I visited the room of this pt who is @ [facility] on respite status as part of my on-going ministry of presence. I have known this pt since I visited him while he was on respite stay in [facility] in December of 2016. We have spoken via telephone periodically since then but he has declined face to face visits by me at this home. Today, he recognized me as I entered the room. He was very upset about a mis-communications regarding his medication regime. I listened to his complaint, validated his emotions, normalized his frustrations, and agreed that his level of pain had been unacceptable to him. During my visit, Dr. Sherren Le and Bonnie Buck entered the room and spoke at length with the pt regarding his medications, and his frustrations. The mis-understanding was cleared and Dr Sherren Le indicated she would write new orders to reflect the current understand. He was able to repeat the new medication to Dr. Sherren Le prior to her leaving. I remained with the pt and we had a long discussion regarding his concerns, frustrations with his life, thoughts about God and \"the other\". The pt had been a 9th [facility] but his local Religious did not contact him when he was unable to attend. He is angry at this behavior. He is very spiritual and tries to use intellect to understand the path on which he finds himself. By the end of my visit, he was calm, apologizing for being frustrated and \"snapping at the Doctor and . \"  He asked me to visit him tomorrow and I said I would. I offered a prayer and left. GOALS:  Continue to visit this pt and his family if/when present,  to provide pastoral care and spiritual support to assist both the pt and them with coping with this declining medical situation.    Continue to build trust and familiarity with the family to encourage a discussion of their spiritual thoughts and concerns at a deeper and more personal level if they so choose. Validate the emotions and normalize the anticipatory grief of the family regarding this declining situation. Offer written spiritual material to the family as needed or requested and provide a listening presence when needed. PLAN:   Continue to visit this pt and his family, while he is @ [facility] and I am in this facility, or PRN as requested. Coordinate w/ [facility] and assume  Responsibilities for this pt/family. Coordinate with Care Team on POC.  VISIT FREQUENCY:   1 wk 1 starting 5/18 plus 2 prn 5 days.    Saroj Arnold MTS, [address] [employer]   [phone]

## 2017-05-19 NOTE — PROGRESS NOTES
1900 Recd report that patient did not want me back as a nurse however other nurse does not want to take this patient. I went into patient room with his 1900 medication so it would not be late and he did not mention anything about having me as a nurse. I gave him the bottle of Hand  I bought him and we talked about his day. I confirmed with him his medication had been doubled and scheduled every four hrs so he does not have to ask for it. Patient does not have any complaints but did report that he has popped a boil in soheila area that I need to look at. I told him I will get report on my other patients and then will check this out. 2230 Complete bath and linen change, patient has open draining boil soheila area midline, dry sterile gauze applied and pullup applied to hold in place. 2300 Talked to patient 30 minutes about his whole life situation therapeutic listening, patient is angry with chronic illness, angry with wife, angry with brother who doesn't speak to him, angry with [employer] for not visiting him, encouraging words provided  0030 Patient asked to be positioned on side of the bed assisted by CNA  0200 Patient asked to be put back to bed reminded patient I would be back at 0300 for his scheduled oxycodone  0300 Took scheduled oxycodone to patient room, he is very upset at this time stating he wants his methadone that he only recd it once yesterday and he wants it now. Patient stated that he threw up the methadone and was not remedicated. I believe it was reported that he refused to be remedicated. I reminded patient that this was the 0900 dose and he did receive methadone at 1800 last pm. He didn't remember this and stated it didn't matter that he did not receive two doses and wants it now. I explained to patient that his next dose of methadone is at 0900 not that far away. He demanded I call the MD and get an order for an extra methadone.  I called Dr Samia Esparza and she did not order an extra dose of methadone. I discussed with Dr Alaina Quiros patients earlier complaint of anxiety and ativan was ordered. Went back to patient to explain, he states we are messing his medication all up and he might as well leave. He is refusing his 0300 oxycodone. I tried to reason with him and stated this is the medication that last night you told me you had to have every 4 hrs or you get sick. He stated he doesn't take anything at 3am. Patient refusing oxycodone at this time and also the ativan. I explained to patient Im trying very hard to please you but I cannot medicate you now with medicine you threw up at 0930 the day before. 0400 Resting quietly at this time  0530 Patient called nurse to room requested his water pitcher be refilled and paper cup replaced, water pitcher had just been refilled which still contained ice  0700 Report to oncoming shift      NAME OF PATIENT: Ab Garcia      LEVEL OF CARE: Respite      REASON FOR RESPITE:  Caregiver breakdown and Caregiver cannot care for patient due to: Exhaustion      O2 SAFETY:  Oxygen sign on the door      FALL INTERVENTIONS PROVIDED:   Implemented/recommended assistive devices and encouraged their use and Implemented/recommended resources for alarm system (personal alarm, bed alarm, call bell, etc.)       INTERDISPLINARY COMMUNICATION/COLLABORATION:  Physician, MSW, [place] and RN, CNA      NEW MEDICATION INITIATION DOCUMENTATION: No new meds initiated at this time.       COMFORTABLE DYING MEASURE:  Is Patient/family satisfied with symptom level? yes      DISCHARGE PLAN: Pt will return home and continue to be followed by Home Hospice.

## 2017-05-20 NOTE — PROGRESS NOTES
[address] [place] [address] [facility]  [phone]    Patient Name: Jaswinder Oliveros  YOB: 1960    Date of Provider Hospice Visit: 05/20/17    Level of Care:   [] General Inpatient (GIP)    [] Routine   [x] Respite    Location of Care:  [] [facility] [place] [] [facility] [] [facility] [] [facility] [x] Hospice [facility]  [] Home [] Other:      Date of Original Hospice Admission: 10-18-16  Hospice Attending: Dr Emre Durham Diagnosis:  inclusion body myositis, cardiomyopathy, CAD, chronic systolic heart failure, Pulmonary HTN, essential HTN, PAF, EF 20%, CHF        HOSPICE NARRATIVE COMPOSED BY PHYSICIAN   Rationale for a prognosis of life expectancy of 6 months or less if the disease follows its normal course:    Jaswinder Oliveros is a 60y.o. year old who was admitted to [facility] operated by [facility] for respite Care.     Since admission to Hospice the patient's principle diagnosis of inclusion body myositis has resulted in cardiomyopathy with episodes of CHF, steroid dependency, and severe debility. the patient has demonstrated the following signs/symptoms of decline: inability to perform ADLs, profound weakness, ongoing pain, shortness of breath with low oxygen saturations and now with early stages of wounds due to his inability to move into a comfortable position, he is also having difficulty swallowing. and lower extremity contractures with severe chronic and acute pain syndrome. HOSPICE DIAGNOSES   Active Symptoms:  1. Generalized pain especially in the low spine and all over muscle pain  2. Shortness of breath  3. Weakness/debility/Fatigue   4. Anxiety/Irritability     PLAN   1. Continue respite care  2. Continue methadone 5mg 2 x day  3. Continue oxycodone 60mg q4h scheduled; pt may refuse if he is not hurting  4. Continue PCA dilaudid for now; pt not using much; wean off gradaually  5. Continue sulindac 200mg bid to help with antiinflammatory adjunct response.   6. Pt is quite depressed and stays anxious, continue zoloft, psychosocial support, scheduled ativan 0.5mg bid to help with anxiety  7. D/c BP meds: coreg, bumex and lisinopril as pt's BP is quite low  8. Add senna S 2 tabs po bid to help with BM. 5.  and SW to support family needs  10. Disposition: home after respite    Prognosis estimated based on 05/20/17 clinical assessment is:   [] Few to Many Hours  [] Few to Many Days    [x] Few to Many Weeks    [] Few to Many Months    Communicated plan of care with: Hospice Case Manager; Hospice IDT; Care Team     GOALS OF CARE     Resuscitation Status: DNR  Durable DNR: [x] Yes [] No unknown    Advance Care Planning 12/3/2016   Patient's Healthcare Decision Maker is: Named in scanned ACP document   Primary Decision Maker Name Bethany Caban   Primary Decision Maker Phone Number [phone]   Primary Decision Maker Relationship to Patient Spouse   Confirm Advance Directive Yes, on file   Does the patient have other document types -        HISTORY     History obtained from: chart, pt, nursing    CHIEF COMPLAINT: generalized pain and weakness  The patient is:   [x] Verbal  [] Nonverbal  [] Unresponsive    HPI/SUBJECTIVE:  Pt seen resting comfortably and sleeping at this time but per nurse report he has been refusing most of is meds including BP medications and methadone. He has been very confused and irritable and also very depressed as well.         FUNCTIONAL ASSESSMENT     Palliative Performance Scale (PPS): 20%     PSYCHOSOCIAL/SPIRITUAL ASSESSMENT     Active Problems:    Inclusion body myositis (6/14/2013)      Past Medical History:   Diagnosis Date    Autoimmune disease ([facility]     MD    CAD (coronary artery disease) 2012    CHF (congestive heart failure) ([facility]     Gastrointestinal disorder     Hypertension     Muscular dystrophy ([facility]     Neurological disorder     Inculsion body myocitis    Sleep disorder     Unspecified sleep apnea       Past Surgical History:   Procedure Laterality Date    CARDIAC SURG PROCEDURE UNLIST      stent cardiac cath    HX ORTHOPAEDIC      right knee    HX PACEMAKER      MT COLONOSCOPY FLX DX W/COLLJ SPEC WHEN PFRMD  12/12/2013         MT ESOPHAGOGASTRODUODENOSCOPY TRANSORAL DIAGNOSTIC  12/12/2013           Social History   Substance Use Topics    Smoking status: Former Smoker     Packs/day: 1.00     Years: 25.00     Types: Cigarettes    Smokeless tobacco: Never Used      Comment: trying to quit    Alcohol use No     Family History   Problem Relation Age of Onset    Heart Disease Other     Hypertension Father       Allergies   Allergen Reactions    Amoxicillin Nausea and Vomiting    Morphine Other (comments)     Pt states when he got the pill form of morphine, he \"couldn't move\"    3/3-Pt stated he is not allergic to morphine. He has requested to have liquids for increase shortness of breath. Per patient he does not have a true allergy he just did not know how to explain how the pills made him feel.        Current Facility-Administered Medications   Medication Dose Route Frequency    bisacodyl (DULCOLAX) suppository 10 mg  10 mg Rectal DAILY PRN    senna-docusate (PERICOLACE) 8.6-50 mg per tablet 2 Tab  2 Tab Oral BID    LORazepam (ATIVAN) tablet 1 mg  1 mg Oral Q4H PRN    LORazepam (ATIVAN) tablet 0.5 mg  0.5 mg Oral Q12H    sulindac (CLINORIL) tablet 200 mg  200 mg Oral BID WITH MEALS    oxyCODONE IR (OXY-IR) immediate release tablet 60 mg  60 mg Oral Q4H    ondansetron (ZOFRAN ODT) tablet 4 mg  4 mg Oral Q8H PRN    tamsulosin (FLOMAX) capsule 0.4 mg  0.4 mg Oral DAILY    predniSONE (DELTASONE) tablet 20 mg  20 mg Oral DAILY WITH BREAKFAST    digoxin (LANOXIN) tablet 0.25 mg  250 mcg Oral DAILY    methadone (DOLOPHINE) tablet 5 mg  5 mg Oral BID    sertraline (ZOLOFT) tablet 50 mg  50 mg Oral DAILY    aspirin chewable tablet 81 mg  81 mg Oral DAILY    nitroglycerin (NITROSTAT) tablet 0.4 mg  0.4 mg SubLINGual PRN    HYDROmorphone (DILAUDID) 2mg/mL PCA   IntraVENous CONTINUOUS        PHYSICAL EXAM     Wt Readings from Last 3 Encounters:   01/04/17 86.2 kg (190 lb)   12/07/16 86.2 kg (190 lb)   10/18/16 88.5 kg (195 lb)       Visit Vitals    BP (!) 86/55 (BP 1 Location: Left arm, BP Patient Position: Sitting)    Pulse 80    Temp 97.9 °F (36.6 °C)    Resp 24    SpO2 91%       Supplemental O2  [x] Yes  [] NO  Last bowel movement:     Currently this patient has:  [] Peripheral IV [] PICC  [] PORT [] ICD    [] Lam Catheter [] NG Tube   [] PEG Tube    [] Rectal Tube [] Drain  [x] Other:  CADD pump    Constitutional: pt is resting comfortably and sleeping, wakes up when called but could not answer questions.    Eyes: pupils equal, anicteric  ENMT: no nasal discharge, moist mucous membranes  Cardiovascular: regular rhythm, distal pulses intact  Respiratory: breathing not labored, symmetric  Gastrointestinal: soft non-tender, +bowel sounds  Musculoskeletal: no deformity, no tenderness to palpation  Skin: warm, dry  Neurologic: asleep, moving all extremities  Psychiatric: calm, asleep  Other:       Pertinent Lab and or Imaging Tests:  Lab Results   Component Value Date/Time    Sodium 140 09/30/2016 04:09 AM    Potassium 4.2 09/30/2016 04:09 AM    Chloride 104 09/30/2016 04:09 AM    CO2 30 09/30/2016 04:09 AM    Anion gap 6 09/30/2016 04:09 AM    Glucose 110 09/30/2016 04:09 AM    BUN 9 09/30/2016 04:09 AM    Creatinine 0.54 09/30/2016 04:09 AM    BUN/Creatinine ratio 17 09/30/2016 04:09 AM    GFR est AA >60 09/30/2016 04:09 AM    GFR est non-AA >60 09/30/2016 04:09 AM    Calcium 9.2 09/30/2016 04:09 AM     Lab Results   Component Value Date/Time    Protein, total 7.4 09/28/2016 07:06 AM    Albumin 3.4 09/28/2016 07:06 AM           Total time: 35mts  Counseling / coordination time:10 mts discussing with staff  > 50% counseling / coordination?:       Chen Arteaga MD

## 2017-05-20 NOTE — PROGRESS NOTES
[address] [address] Follow-up Visit       I visited the room of this pt who is @ [facility] on respite status as part of my on-going ministry of presence. I have known this pt since I visited him while he was on respite stay in [address] [place] [address] in December of 2016. We have spoken via telephone periodically since then but he has declined face to face visits by me at this home. Today, he was less talkative and his spirits were moderate. He said this medication schedule \"was working\" which has lowered his anxiety from yesterday. We had another somewhat disjointed discussion regarding his concerns, frustrations with his life, thoughts about God and \"the other\". The pt had been a 9th [address] but his local [employer] did not contact him when he was unable to attend. He is angry at this behavior. He is very spiritual and tries to use intellect to understand the path on which he finds himself. By the end of my visit, he was sleepy and said he wanted to rest.   He asked me to visit him tomorrow and I said I would was off for the weekend but that I would see him on Monday. I offered a prayer and left. GOALS:  Continue to visit this pt and his family if/when present, to provide pastoral care and spiritual support to assist both the pt and them with coping with this declining medical situation. Continue to build trust and familiarity with the family to encourage a discussion of their spiritual thoughts and concerns at a deeper and more personal level if they so choose. Validate the emotions and normalize the anticipatory grief of the family regarding this declining situation. Offer written spiritual material to the family as needed or requested and provide a listening presence when needed. PLAN:   Continue to visit this pt and his family, while he is @ [facility] and I am in this facility, or PRN as requested. Coordinate with Care Team on POC.     VISIT FREQUENCY:   1 wk 1 starting 5/18 plus 2 prn 5 days.    Quincy Kerr [facility], [address]   [phone]

## 2017-05-20 NOTE — PROGRESS NOTES
Verbal shift change report given to Ardena Fleischer (oncoming nurse) by Albin Mckeon (offgoing nurse). Report included the following information SBAR and Kardex. NAME OF PATIENT:  Zada Najjar    LEVEL OF CARE:  Respite x 5 days. REASON FOR GIP:   n/a    *PATIENT REMAINS ELIGIBLE FOR GIP LEVEL OF CARE AS EVIDENCED BY: (MUST BE ADDRESSED OF PATIENT GIP)      REASON FOR RESPITE:  Caregiver breakdown    O2 SAFETY:  Concentrator positioning (6\" from furniture/drapes), No petroleum based products on face while oxygen in use and Oxygen sign on the door    FALL INTERVENTIONS PROVIDED:   Implemented/recommended assistive devices and encouraged their use, Implemented/recommended resources for alarm system (personal alarm, bed alarm, call bell, etc.) , Implemented/recommended environmental changes (remove hazards, lower bed, improve lighting, etc.) and Implemented/recommended increased supervision/assistance    INTERDISPLINARY COMMUNICATION/COLLABORATION:  Physician, MSW, [place] and RN, CNA    NEW MEDICATION INITIATION DOCUMENTATION:  No new medications    Reason medication is being initiated:  n/a    MD / Provider name consulted re: change in status / initiation of new medication:  n/a    New Symptom(s):  None    New Order(s):  NOne    Name of the person notified of the changes:  n/a    Name of person being taught:  n/a    Instructions given:  n/a    Side Effects taught:  n/a    Response to teaching:  n/a      COMFORTABLE DYING MEASURE:  Is Patient/family satisfied with symptom level?  yes    DISCHARGE PLAN:  Return home with wife after respite care is over. Night shift summary 1900 - 0700.  1900 - Report received. 2050 - Complaint of pain level #8 right side of body. Patient is alert and oriented x 3. Receiving Dilaudid at 1 mg per hour continuously via CADD pump with a 1 mg PCA dose every 6 minutes as needed. Patient has to be reminded and encouraged to use the PCA control button.    2215 - Breathing is even and unlabored. Lung sounds are clear. 90% on 2 liters of nasal oxygen. Bedbound. Taking and tolerating po fluids; takes his pills whole with water or drink. 2318 - Complaint of pain #7 right side of body. Discussion regarding constipation and laxatives. Patient told that he needs to be on some bowel regimen because he is on narcotics for pain. Last bowel movement 5/15/2017.   0100 - Asleep.  0300 - Awake. Eating. 0530 - Asleep.

## 2017-05-20 NOTE — PROGRESS NOTES
NAME OF PATIENT:  Cherylene Lemmings    LEVEL OF CARE:  Respite    REASON FOR GIP:   n/a    *PATIENT REMAINS ELIGIBLE FOR GIP LEVEL OF CARE AS EVIDENCED BY: (MUST BE ADDRESSED OF PATIENT GIP)      REASON FOR RESPITE:  Caregiver breakdown    O2 SAFETY:  Concentrator positioning (6\" from furniture/drapes), Tanks stored in hawley , No petroleum based products on face while oxygen in use and Oxygen sign on the door    FALL INTERVENTIONS PROVIDED:   Implemented/recommended resources for alarm system (personal alarm, bed alarm, call bell, etc.) , Implemented/recommended environmental changes (remove hazards, lower bed, improve lighting, etc.) and Implemented/recommended increased supervision/assistance    INTERDISPLINARY COMMUNICATION/COLLABORATION:  Physician, MSW, [place] and RN, CNA    NEW MEDICATION INITIATION DOCUMENTATION:  n/a    Reason medication is being initiated:  n/a    MD / Provider name consulted re: change in status / initiation of new medication:  n/a    New Symptom(s):  n/a    New Order(s):  n/a    Name of the person notified of the changes:  n/a    Name of person being taught:  n/a    Instructions given:  n/a    Side Effects taught:  n/a    Response to teaching:  n/a      COMFORTABLE DYING MEASURE:  Is Patient/family satisfied with symptom level?  yes    DISCHARGE PLAN:  Once respite stay is complete, patient will go home with [facility]. 0715:  Verbal shift change report given to Juarez Keller RN (oncoming nurse) by Figueroa Baker RN (offgoing nurse). Report included the following information SBAR, Kardex, Intake/Output and MAR.   1146:  Administered scheduled oxycodone IR 60mg/PO. Asked patient what is their pain level, 7/10. Educated patient on PCA dosing. Patient pushed PCA for bolus dose of 1mg of dilaudid. Will reassess.   1745:  Administered prn dulcolax suppository 10mg/RE d/t patient not having a bowel movement since 5/15/17

## 2017-05-21 NOTE — PROGRESS NOTES
0700  Report received from NT RN.  1632. Pt lying in bed,  Pt reports his pain is 7/10  And he reports that he feels nauseous and there are fruit flies in his room. Rn turned on the lights and pt was able to see that there are not bugs present. Pt reports he wants to wait on pain meds until his breakfast comes. Rn encouraged him to use his PCA button    Pt stated, I know. I just dont want to. Lungs clear. No complaints of dyspnea at this time. O2 at 2lpm.  No cough noted. Pt has hyperactive bowel sounds. Last reported BM was yest.  Pt is able to use the urinal.  No edema noted. Pt has a CADD pump infusing into his right PICC. Dressing is clear and intact. Dilaudid  Res vol.  73.4, 1mg/hr with 1mg q 6 mins with max 10/hr. Pt pushed his PCA during assessment and was sleeping without grimacing at the end of assessment. 1000  Pt sleeping. No complaints at this time. 46  Pt requesting his Scheduled pain medications early. Pt medicated with Oxycodone. 1230  Pt sitting up in bed, eating his lunch. No complaints at this time. 1400  Pt sleeping. 1458  Pt medicated with scheduled Oxycodone. 1645  Pt sleeping. No complaints at this time. 1800  Pr requesting a Soda for dinner. Pt refused to eat but wanted a drink. Pt given a soda . No complaints. 1900  Report given. 1925  PCA cleared. . Res vol 63.7. Given 9, attempts 9  Total given  21.60mg.         NAME OF PATIENT:  Patricia Meek    LEVEL OF CARE:  Respite    REASON FOR RESPITE:  Caregiver breakdown and Caregiver cannot care for patient due to:  exhaustion    O2 SAFETY:  Oxygen sign on the door    FALL INTERVENTIONS PROVIDED:   Implemented/recommended assistive devices and encouraged their use    INTERDISPLINARY COMMUNICATION/COLLABORATION:  Physician, MSW, [place] and RN, CNA    NEW MEDICATION INITIATION DOCUMENTATION:  No new medications initiated,      COMFORTABLE DYING MEASURE:  Is Patient/family satisfied with symptom level?  yes    DISCHARGE PLAN:  Pt will return home with his wife tomorrow and continue to be followed by Home Hospice.

## 2017-05-21 NOTE — HOSPICE
NAME OF PATIENT:  Unique Hartley    LEVEL OF CARE:  respite    REASON FOR GIP:   N/A    *PATIENT REMAINS ELIGIBLE FOR GIP LEVEL OF CARE AS EVIDENCED BY: (MUST BE ADDRESSED OF PATIENT GIP)      REASON FOR RESPITE:  Caregiver breakdown    O2 SAFETY:  Concentrator positioning (6\" from furniture/drapes), Tanks stored in [place] , No petroleum based products on face while oxygen in use and Oxygen sign on the door    FALL INTERVENTIONS PROVIDED:   Implemented/recommended use of non-skid footwear, Implemented/recommended use of fall risk identification flag to all team members, Implemented/recommended assistive devices and encouraged their use, Implemented/recommended resources for alarm system (personal alarm, bed alarm, call bell, etc.)  and Implemented/recommended environmental changes (remove hazards, lower bed, improve lighting, etc.)    INTERDISPLINARY COMMUNICATION/COLLABORATION:  Physician, MSW, [place] and RN, CNA    NEW MEDICATION INITIATION DOCUMENTATION:      Reason medication is being initiated:      MD / Provider name consulted re: change in status / initiation of new medication:      New Symptom(s):      New Order(s):      Name of the person notified of the changes:      Name of person being taught:      Instructions given:      Side Effects taught:      Response to teachin [address] free  Is Patient/family satisfied with symptom level?  yes    DISCHARGE PLAN:  Return home with wife and be followed by home hospice. 20:00,report received,assumed care of pt who is Respite care. Pt has chronic rt sided pain,now 6/10. Pt has a PCA Dilaudid with basal and PCA doses. Pt communicates that he rarely presses PCA button. He is getting scheduled Oxycodone. Pt had a BM in the bed(states he can't use a bedpan). 23:30,c/o nausea,Zopfran given. 01:30,asleep.  02:30,nausea relieved. Pain is 9/10,given scheduled Oxycodone. Pt continues to lie on rt side and refuses to reposition.   I spent time listening to pt about his marriage and family problems,emotional support offered. Pt says he thinks his meds have not been given properly,so he wishes to speak with Sharon Boss on Monday. 05:20,pt voided 175cc giselle,foul smelling urine. 0545:asleep.  06:30,PCA cleared,pt said\"I always forget to push that button\". Scheduled Oxycodone given for chronic rt side body pain 7/10. Pt refused bath at this time,wants to wait until later this am.

## 2017-05-22 NOTE — PROGRESS NOTES
[facility]  [phone]    Patient Name: Patricia Meek  YOB: 1960    Date of Provider Hospice Visit: 05/22/17    Level of Care:   [] General Inpatient (GIP)    [] Routine   [x] Respite    Location of Care:  [] [facility] [] [facility] [] [id number] [] [facility] [x] Hospice [facility]  [] Home [] Other:      Date of Original Hospice Admission: 10-18-16  Hospice Attending: Dr Isaac Hines Diagnosis:  inclusion body myositis, cardiomyopathy, CAD, chronic systolic heart failure, Pulmonary HTN, essential HTN, PAF, EF 20%, CHF        HOSPICE NARRATIVE COMPOSED BY PHYSICIAN   Rationale for a prognosis of life expectancy of 6 months or less if the disease follows its normal course:    Patricia Meek is a 60y.o. year old who was admitted to [facility] for respite Care.     Since admission to Hospice the patient's principle diagnosis of inclusion body myositis has resulted in cardiomyopathy with episodes of CHF, steroid dependency, and severe debility. the patient has demonstrated the following signs/symptoms of decline: inability to perform ADLs, profound weakness, ongoing pain, shortness of breath with low oxygen saturations and now with early stages of wounds due to his inability to move into a comfortable position, he is also having difficulty swallowing. and lower extremity contractures with severe chronic and acute pain syndrome. HOSPICE DIAGNOSES   Active Symptoms:  1. Generalized pain especially in the low spine and all over muscle pain but much better controlled  2. Shortness of breath much improved with use of O2  3. Weakness/debility/Fatigue   4. Anxiety/Irritability  5. Nausea     PLAN   1. To be discharged home today  2. Continue  methadone 5mg three times x day  3. Continue oxycodone 60mg q4h scheduled; pt may refuse if he is not hurting  4. changed short acting dose to dilaudid 2mg q4hrs prn for home use  5.  Discontinue PCA dilaudid for now but wean down basal to 0.5mg/hour and maintain PCA dose as is. 6. Continue sulindac 200mg twice daily to help with antiinflammatory adjunct response. 7. Pt is quite depressed and stays anxious, continue zoloft, psychosocial support, scheduled ativan 0.5mg bid to help with anxiety    8. Continue senna S 2 tabs po bid to help with BM. 5.  and SW to support family needs  10. Disposition: home after respite     Prognosis estimated based on 05/22/17 clinical assessment is:   [] Few to Many Hours  [] Few to Many Days    [x] Few to Many Weeks    [] Few to Many Months    Communicated plan of care with: Hospice Case Manager;  Hospice IDT; Care Team     GOALS OF CARE     Resuscitation Status: DNR  Durable DNR: [x] Yes [] No unknown    Advance Care Planning 12/3/2016   Patient's Healthcare Decision Maker is: Named in scanned ACP document   Primary Decision Maker Name Hilda Queen   Primary Decision Maker Phone Number [phone]   Primary Decision Maker Relationship to Patient Spouse   Confirm Advance Directive Yes, on file   Does the patient have other document types -        HISTORY     History obtained from: chart, pt, nursing    CHIEF COMPLAINT: pain, nausea  The patient is:   [x] Verbal  [] Nonverbal  [] Unresponsive    HPI/SUBJECTIVE:  Pt seen resting comfortably, has taken all his meds today as prescribed but when asked how he was he became irritable and said that he did not want to discuss his meds with us and that he will discuss with his Hospice MD when he gets home tomorrow     FUNCTIONAL ASSESSMENT     Palliative Performance Scale (PPS): 30%     PSYCHOSOCIAL/SPIRITUAL ASSESSMENT     Active Problems:    Inclusion body myositis (6/14/2013)      Past Medical History:   Diagnosis Date    Autoimmune disease ([facility]     MD    CAD (coronary artery disease) 2012    CHF (congestive heart failure) ([facility] [address]     Gastrointestinal disorder     Hypertension     Muscular dystrophy ([facility] [address]     Neurological disorder Inculsion body myocitis    Sleep disorder     Unspecified sleep apnea       Past Surgical History:   Procedure Laterality Date    CARDIAC SURG PROCEDURE UNLIST      stent cardiac cath    HX ORTHOPAEDIC      right knee    HX PACEMAKER      [place] COLONOSCOPY FLX DX W/COLLJ SPEC WHEN PFRMD  12/12/2013         [place] ESOPHAGOGASTRODUODENOSCOPY TRANSORAL DIAGNOSTIC  12/12/2013           Social History   Substance Use Topics    Smoking status: Former Smoker     Packs/day: 1.00     Years: 25.00     Types: Cigarettes    Smokeless tobacco: Never Used      Comment: trying to quit    Alcohol use No     Family History   Problem Relation Age of Onset    Heart Disease Other     Hypertension Father       Allergies   Allergen Reactions    Amoxicillin Nausea and Vomiting    Morphine Other (comments)     Pt states when he got the pill form of morphine, he \"couldn't move\"    3/3-Pt stated he is not allergic to morphine. He has requested to have liquids for increase shortness of breath. Per patient he does not have a true allergy he just did not know how to explain how the pills made him feel.        Current Facility-Administered Medications   Medication Dose Route Frequency    mineral oil (FLEET) enema   Rectal PRN    HYDROmorphone (DILAUDID) tablet 2 mg  2 mg Oral Q4H PRN    methadone (DOLOPHINE) tablet 5 mg  5 mg Oral TID    bisacodyl (DULCOLAX) suppository 10 mg  10 mg Rectal DAILY PRN    senna-docusate (PERICOLACE) 8.6-50 mg per tablet 2 Tab  2 Tab Oral BID    LORazepam (ATIVAN) tablet 1 mg  1 mg Oral Q4H PRN    LORazepam (ATIVAN) tablet 0.5 mg  0.5 mg Oral Q12H    sulindac (CLINORIL) tablet 200 mg  200 mg Oral BID WITH MEALS    oxyCODONE IR (OXY-IR) immediate release tablet 60 mg  60 mg Oral Q4H    ondansetron (ZOFRAN ODT) tablet 4 mg  4 mg Oral Q8H PRN    tamsulosin (FLOMAX) capsule 0.4 mg  0.4 mg Oral DAILY    predniSONE (DELTASONE) tablet 20 mg  20 mg Oral DAILY WITH BREAKFAST    digoxin (LANOXIN) tablet 0.25 mg  250 mcg Oral DAILY    sertraline (ZOLOFT) tablet 50 mg  50 mg Oral DAILY    aspirin chewable tablet 81 mg  81 mg Oral DAILY    nitroglycerin (NITROSTAT) tablet 0.4 mg  0.4 mg SubLINGual PRN        PHYSICAL EXAM     Wt Readings from Last 3 Encounters:   01/04/17 86.2 kg (190 lb)   12/07/16 86.2 kg (190 lb)   10/18/16 88.5 kg (195 lb)       Visit Vitals    /84 (BP 1 Location: Left arm)    Pulse (!) 58    Temp 98.2 °F (36.8 °C)    Resp 18    SpO2 97%       Supplemental O2  [x] Yes  [] NO  Last bowel movement:     Currently this patient has:  [] Peripheral IV [] PICC  [] PORT [] ICD    [] Lam Catheter [] NG Tube   [] PEG Tube    [] Rectal Tube [] Drain  [x] Other:  CADD pump    Constitutional: pt is resting awake and alert, states his pain is very well controlled with methadone  Eyes: pupils equal, anicteric  ENMT: no nasal discharge, moist mucous membranes  Cardiovascular: regular rhythm, distal pulses intact  Respiratory: breathing not labored, symmetric  Gastrointestinal: soft non-tender, +bowel sounds  Musculoskeletal: no deformity, no tenderness to palpation  Skin: warm, dry  Neurologic: NE  Psychiatric: calm  Other:       Pertinent Lab and or Imaging Tests:  Lab Results   Component Value Date/Time    Sodium 140 09/30/2016 04:09 AM    Potassium 4.2 09/30/2016 04:09 AM    Chloride 104 09/30/2016 04:09 AM    CO2 30 09/30/2016 04:09 AM    Anion gap 6 09/30/2016 04:09 AM    Glucose 110 09/30/2016 04:09 AM    BUN 9 09/30/2016 04:09 AM    Creatinine 0.54 09/30/2016 04:09 AM    BUN/Creatinine ratio 17 09/30/2016 04:09 AM    GFR est AA >60 09/30/2016 04:09 AM    GFR est non-AA >60 09/30/2016 04:09 AM    Calcium 9.2 09/30/2016 04:09 AM     Lab Results   Component Value Date/Time    Protein, total 7.4 09/28/2016 07:06 AM    Albumin 3.4 09/28/2016 07:06 AM           Total time: 35mts  Counseling / coordination time:10 mts discussing with staff  > 50% counseling / coordination?:       Ibrahima Palacios, NP

## 2017-05-22 NOTE — PROGRESS NOTES
[facility]  [phone]    Patient Name: Linda Renae  YOB: 1960    Date of Provider Hospice Visit: 05/21/17    Level of Care:   [] General Inpatient (GIP)    [] Routine   [x] Respite    Location of Care:  [] [facility] [] [facility] [] [facility] [] [facility] [x] Hospice [facility]  [] Home [] Other:      Date of Original Hospice Admission: 10-18-16  Hospice Attending: Dr Ashley Caro Diagnosis:  inclusion body myositis, cardiomyopathy, CAD, chronic systolic heart failure, Pulmonary HTN, essential HTN, PAF, EF 20%, CHF        HOSPICE NARRATIVE COMPOSED BY PHYSICIAN   Rationale for a prognosis of life expectancy of 6 months or less if the disease follows its normal course:    Linda Renae is a 60y.o. year old who was admitted to [facility] for respite Care.     Since admission to Hospice the patient's principle diagnosis of inclusion body myositis has resulted in cardiomyopathy with episodes of CHF, steroid dependency, and severe debility. the patient has demonstrated the following signs/symptoms of decline: inability to perform ADLs, profound weakness, ongoing pain, shortness of breath with low oxygen saturations and now with early stages of wounds due to his inability to move into a comfortable position, he is also having difficulty swallowing. and lower extremity contractures with severe chronic and acute pain syndrome. HOSPICE DIAGNOSES   Active Symptoms:  1. Generalized pain especially in the low spine and all over muscle pain  2. Shortness of breath  3. Weakness/debility/Fatigue   4. Anxiety/Irritability  5. Nausea     PLAN   1. Continue respite care  2. Increase methadone 5mg three times x day  3. Continue oxycodone 60mg q4h scheduled; pt may refuse if he is not hurting  4. Continue PCA dilaudid for now but wean down basal to 0.5mg/hour and maintain PCA dose as is.    5. Continue sulindac 200mg twice daily to help with antiinflammatory adjunct response. 6. Pt is quite depressed and stays anxious, continue zoloft, psychosocial support, scheduled ativan 0.5mg bid to help with anxiety    7. Continue senna S 2 tabs po bid to help with BM. 8.  and SW to support family needs  9. Disposition: home after respite tomorrow    Prognosis estimated based on 05/21/17 clinical assessment is:   [] Few to Many Hours  [] Few to Many Days    [x] Few to Many Weeks    [] Few to Many Months    Communicated plan of care with: Hospice Case Manager;  Hospice IDT; Care Team     GOALS OF CARE     Resuscitation Status: DNR  Durable DNR: [x] Yes [] No unknown    Advance Care Planning 12/3/2016   Patient's Healthcare Decision Maker is: Named in scanned ACP document   Primary Decision Maker Name Anne Hernandez   Primary Decision Maker Phone Number [phone]   Primary Decision Maker Relationship to Patient Spouse   Confirm Advance Directive Yes, on file   Does the patient have other document types -        HISTORY     History obtained from: chart, pt, nursing    CHIEF COMPLAINT: pain, nausea  The patient is:   [x] Verbal  [] Nonverbal  [] Unresponsive    HPI/SUBJECTIVE:  Pt seen resting comfortably, has taken all his meds today as prescribed but when asked how he was he became irritable and said that he did not want to discuss his meds with us and that he will discuss with his Hospice MD when he gets home tomorrow     FUNCTIONAL ASSESSMENT     Palliative Performance Scale (PPS): 20%     PSYCHOSOCIAL/SPIRITUAL ASSESSMENT     Active Problems:    Inclusion body myositis (6/14/2013)      Past Medical History:   Diagnosis Date    Autoimmune disease ([facility] [address]     MD    CAD (coronary artery disease) 2012    CHF (congestive heart failure) ([facility] [address]     Gastrointestinal disorder     Hypertension     Muscular dystrophy ([facility] [address]     Neurological disorder     Inculsion body myocitis    Sleep disorder     Unspecified sleep apnea       Past Surgical History:   Procedure Laterality Date    CARDIAC SURG PROCEDURE UNLIST      stent cardiac cath    HX ORTHOPAEDIC      right knee    HX PACEMAKER      [place] COLONOSCOPY FLX DX W/COLLJ SPEC WHEN PFRMD  12/12/2013         [place] ESOPHAGOGASTRODUODENOSCOPY TRANSORAL DIAGNOSTIC  12/12/2013           Social History   Substance Use Topics    Smoking status: Former Smoker     Packs/day: 1.00     Years: 25.00     Types: Cigarettes    Smokeless tobacco: Never Used      Comment: trying to quit    Alcohol use No     Family History   Problem Relation Age of Onset    Heart Disease Other     Hypertension Father       Allergies   Allergen Reactions    Amoxicillin Nausea and Vomiting    Morphine Other (comments)     Pt states when he got the pill form of morphine, he \"couldn't move\"    3/3-Pt stated he is not allergic to morphine. He has requested to have liquids for increase shortness of breath. Per patient he does not have a true allergy he just did not know how to explain how the pills made him feel.        Current Facility-Administered Medications   Medication Dose Route Frequency    methadone (DOLOPHINE) tablet 5 mg  5 mg Oral TID    bisacodyl (DULCOLAX) suppository 10 mg  10 mg Rectal DAILY PRN    senna-docusate (PERICOLACE) 8.6-50 mg per tablet 2 Tab  2 Tab Oral BID    LORazepam (ATIVAN) tablet 1 mg  1 mg Oral Q4H PRN    LORazepam (ATIVAN) tablet 0.5 mg  0.5 mg Oral Q12H    sulindac (CLINORIL) tablet 200 mg  200 mg Oral BID WITH MEALS    oxyCODONE IR (OXY-IR) immediate release tablet 60 mg  60 mg Oral Q4H    ondansetron (ZOFRAN ODT) tablet 4 mg  4 mg Oral Q8H PRN    tamsulosin (FLOMAX) capsule 0.4 mg  0.4 mg Oral DAILY    predniSONE (DELTASONE) tablet 20 mg  20 mg Oral DAILY WITH BREAKFAST    digoxin (LANOXIN) tablet 0.25 mg  250 mcg Oral DAILY    sertraline (ZOLOFT) tablet 50 mg  50 mg Oral DAILY    aspirin chewable tablet 81 mg  81 mg Oral DAILY    nitroglycerin (NITROSTAT) tablet 0.4 mg  0.4 mg SubLINGual PRN    HYDROmorphone (DILAUDID) 2mg/mL PCA   IntraVENous CONTINUOUS        PHYSICAL EXAM     Wt Readings from Last 3 Encounters:   01/04/17 86.2 kg (190 lb)   12/07/16 86.2 kg (190 lb)   10/18/16 88.5 kg (195 lb)       Visit Vitals    /52 (BP 1 Location: Left arm, BP Patient Position: At rest)    Pulse 66    Temp 97.6 °F (36.4 °C)    Resp 16    SpO2 93%       Supplemental O2  [x] Yes  [] NO  Last bowel movement:     Currently this patient has:  [] Peripheral IV [] PICC  [] PORT [] ICD    [] Lam Catheter [] NG Tube   [] PEG Tube    [] Rectal Tube [] Drain  [x] Other:  CADD pump    Constitutional: pt is resting comfortably and sleeping, wakes up and feels ok.    Eyes: pupils equal, anicteric  ENMT: no nasal discharge, moist mucous membranes  Cardiovascular: regular rhythm, distal pulses intact  Respiratory: breathing not labored, symmetric  Gastrointestinal: soft non-tender, +bowel sounds  Musculoskeletal: no deformity, no tenderness to palpation  Skin: warm, dry  Neurologic: asleep, moving all extremities  Psychiatric: calm, asleep  Other:       Pertinent Lab and or Imaging Tests:  Lab Results   Component Value Date/Time    Sodium 140 09/30/2016 04:09 AM    Potassium 4.2 09/30/2016 04:09 AM    Chloride 104 09/30/2016 04:09 AM    CO2 30 09/30/2016 04:09 AM    Anion gap 6 09/30/2016 04:09 AM    Glucose 110 09/30/2016 04:09 AM    BUN 9 09/30/2016 04:09 AM    Creatinine 0.54 09/30/2016 04:09 AM    BUN/Creatinine ratio 17 09/30/2016 04:09 AM    GFR est AA >60 09/30/2016 04:09 AM    GFR est non-AA >60 09/30/2016 04:09 AM    Calcium 9.2 09/30/2016 04:09 AM     Lab Results   Component Value Date/Time    Protein, total 7.4 09/28/2016 07:06 AM    Albumin 3.4 09/28/2016 07:06 AM           Total time: 35mts  Counseling / coordination time:10 mts discussing with staff  > 50% counseling / coordination?:       Mandy Weinstein MD

## 2017-05-22 NOTE — PROGRESS NOTES
0700  Report received from NT.    1091  Pt lying in bed, Awake. Pt requesting to be repositioned. .  Pt turned and repositioned. Pt reports pain is 7 or 8/10. Lungs clear   O2 at 2lpm.  No dyspena noted. Pt has hyperative bowel sounds. Last reported Bm was 5-20. Pt uses a urinal to void. No edema noted. Skin is intact. Pt has a PICC in his right upper arm. Dressing is clear and intact. Dilaudid infusing at 0.5mg/hr with 1mg q 6 max 10  0825  Pt requesting an enema prior to discharge. Enema given  0930  Pt had a large BM. Cleven Alexs No further complaints or needs at this time. 1100  Pt sleeping. No facial grimacing at this time. 1211  Pt medicated with scheduled Oxycodone. PCA discontinued. Res Vol 586. Witnessed with MARK MILLER.    8131  NP Raji Garcia at the bedside. 1300  Report called to Pedro Maria. 1315  PICC pulled per Raji Garcia NP.    8451  [facility] here to  Mr Adama Short. Belongings packed, (cell phone, cord, electric tooth pick, Bible) etc.  DNR, meds also sent. [phone]  [facility]    NAME OF PATIENT:  Shalini Setting    LEVEL OF CARE:  Respite    REASON FOR RESPITE:  Caregiver breakdown and Caregiver cannot care for patient due to:  exhaustion    O2 SAFETY:  Oxygen sign on the door    FALL INTERVENTIONS PROVIDED:   Implemented/recommended use of fall risk identification flag to all team members and Implemented/recommended resources for alarm system (personal alarm, bed alarm, call bell, etc.)     INTERDISPLINARY COMMUNICATION/COLLABORATION:  Physician, MSW, [place] and RN, CNA    NEW MEDICATION INITIATION DOCUMENTATION:  No new medications initiated. COMFORTABLE DYING MEASURE:  Is Patient/family satisfied with symptom level?  yes    DISCHARGE PLAN:  Pt is to be discharged back home to his wife and continue to be followed by Home Hospice.

## 2017-05-22 NOTE — PROGRESS NOTES
[address] [facility] Follow-up Visit        I visited the room of this pt who is @ [facility] on respite status as part of my on-going ministry of presence. I have known this pt since I visited him while he was on respite stay in [facility] in December of 2016. We have spoken via telephone periodically since then but he has declined face to face visits by me at this home. Today, he was upbeat and talkative as he is going home later this afternoon. He said his medication schedule \"was working\" which has lowered his anxiety but he still struggles to control all aspects of that part of his care. We had another somewhat disjointed discussion regarding his concerns, frustrations with his life, thoughts about God and \"the other\". As I have learned, the pt had been a [address] but his local [employer] did not contact him when he was unable to attend. He is angry at this behavior. He wants to attend a Bible Study and I suggested he try to fine one either on television or perhaps even on his computer at home. He is very spiritual and tries to use intellect to understand the path on which he finds himself. He asked me to \"stay in touch\" and I asked if he wanted me to call him at home and he said yes. I offered a prayer and a blessing for his trip home. GOALS:   If he so desires,visit this pt and his family at his home, to provide pastoral care and spiritual support to assist both the pt and them with coping with this declining medical situation. Continue building rust and familiarity with the patient encourage a discussion of his spiritual thoughts and concerns at a deeper and more personal level if he so choose. Validate the emotions and normalize the anticipatory grief of the family regarding this declining situation. Offer written spiritual material to the family as needed or requested and provide a listening presence when needed.    PLAN:   Call this pt in early June to ascertain whether he wishes me to visit him at his home. Until then respond to requests for support as received. Coordinate with Care Team on POC.     VISIT FREQUENCY:   Ascertain after telephone call in early June  Natalie Puente MTS, [address]   [phone]

## 2017-05-22 NOTE — HOSPICE
NAME OF PATIENT:  Lou Rosado    LEVEL OF CARE:  respite    REASON FOR GIP:   N/A    *PATIENT REMAINS ELIGIBLE FOR GIP LEVEL OF CARE AS EVIDENCED BY: (MUST BE ADDRESSED OF PATIENT GIP)      REASON FOR RESPITE:  Caregiver breakdown    O2 SAFETY:  Concentrator positioning (6\" from furniture/drapes), Tanks stored in [place] , No petroleum based products on face while oxygen in use and Oxygen sign on the door    FALL INTERVENTIONS PROVIDED:   Implemented/recommended use of non-skid footwear, Implemented/recommended use of fall risk identification flag to all team members, Implemented/recommended assistive devices and encouraged their use, Implemented/recommended resources for alarm system (personal alarm, bed alarm, call bell, etc.)  and Implemented/recommended environmental changes (remove hazards, lower bed, improve lighting, etc.)    INTERDISPLINARY COMMUNICATION/COLLABORATION:  Physician, MSW, Garo and RN, CNA    NEW MEDICATION INITIATION DOCUMENTATION:  N/A    Reason medication is being initiated:    MD / Provider name consulted re: change in status / initiation of new medication:      New Symptom(s):      New Order(s):      Name of the person notified of the changes:      Name of person being taught:      Instructions given:      Side Effects taught:      Response to teachin [address] free  Is Patient/family satisfied with symptom level?  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 Pt is talking on his phone. He states chronic pain,rt side of body is 6-7/10. PCA Dilaudid is infusing via ALCON PICC @ 1mg/hr with prn dose 1mg every 10 min. Pt was given scheduled Oxycodone 60 mg po as ordered. Continue to monitor. 21:00,pt refuses scheduled Ativan dose,\"I don't like the way it makes me feel\". Pt states pain level is unchanged,6/10. I encouraged him to use PCA button as needed. 21:15, called,orders changed. PCA basal rate decreased to 0.5mg/hr,Methadone increased to 5mg TID. Changes made as ordered. 00:00,napping on and off.  02:30,requesting help to sit up on bedside to eat a snack. 03:00,given scheduled Oxycodone,pain level is 7/10. Pt remains up on side of bed.  03:30,assisted to lay back down,wants to have a BM,bed padded per his request,as he is unable to use a bed pan.  06:30,pain level remains 6/10. PCA pump cleared,1 attempt,1 dose given. Total Dilaudid was 7.5 mg for this shift.

## 2017-06-16 NOTE — CERTIFICATE OF TERMINAL ILLNESS
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)  Other Hospice diagnoses:     Cardiomyopathy ([address] (I42.9)     Coronary artery disease without angina pectoris, unspecified vessel or lesion type, unspecified whether native or transplanted heart (I25.10)     Chronic systolic HF (heart failure) (HCC) (I50.22)     Unspecified essential hypertension (I10)     Pulmonary HTN (HCC) (I27.2)     Paroxysmal atrial fibrillation (HCC) (I48.0)     Depression, unspecified depression type (F32.9)     Lumbar spinal stenosis (M48.06)     Benign prostatic hyperplasia, presence of lower urinary tract symptoms unspecified, unspecified morphology (N40.0)     Chronic pain syndrome (G89.4)     Encounter for hospice care (Z51.5)    Benefit Period: Benefit Period 4  Start Date: 6/15/2017  End Date: 8/13/2017       HOSPICE RE-CERTIFICATION NARRATIVE COMPOSED BY PHYSICIAN     On recertification assessment the patient has had a [place] [address] stay 5/17 to 5/22/17 for respite for Caregiver [place] due to increasing care needs in this benefit period. He is unable to perform any ADLs independently due to his weakness from the inclusion body myositis. On his F2F visit by [facility], she found continued progressive muscle weakness. He has had increased shortness of breath with oxygen at 82% on 2L and now he is on 3L. We were able to wean his PCA by transitioning to methadone and oxycodone but he continues to have global and total pain. He has severe depression and anxiety related to his progressive loss of independence. His PPS is about 40%. He continues to have low BP in the 90s as related to his muscle disease and his heart function with an EF of 20% and Class III symptoms.      Based on this information, the patient continues to have a prognosis of less than 6 months and remains appropriate for Hospice services.     This Hospice Admission RE-CERTIFICATION Narrative (CTI) is composed by the physician and is based on:   [x] Review of Medical Record  [x] Interdisciplinary assessment   [x] Face to Face visit by Provider (MD or NP) on 5/16/17  ______________________________________________________________________

## 2017-06-19 NOTE — PROGRESS NOTES
Palliative Medicine  Nursing Note  [phone]  Fax [phone]        Clinic Office Visit  Patient Name: Laura Pascal  YOB: [birth year]      Advance Care Planning 12/3/2016   Patient's Healthcare Decision Maker is: Named in scanned ACP document   Primary Decision Maker Name Anne Hernandez   Primary Decision Maker Phone Number [phone]   Primary Decision Maker Relationship to Patient Spouse   Confirm Advance Directive Yes, on file   Does the patient have other document types -       Received email about Dorothea Hunter from Dr. Tiny Kelsey to reorder his prednisone 20 mg daily. Prescription was sent to [facility] at [address]. [facility] nurse Maryellen Baum was notified that the prescription will be sent.       ALFREDO CameronN, RN, OCN, [facility]  Palliative Medicine

## 2017-06-21 PROBLEM — M60.9 MYOSITIS: Status: ACTIVE | Noted: 2017-01-01

## 2017-06-21 NOTE — PROGRESS NOTES
Bedside shift change report given to Harshal Lopes RN (oncoming nurse) by Marcus Lopez RN (offgoing nurse). Report included the following information SBAR, Kardex, MAR and Recent Results. 20:50  Pt awake and alert. Repositioned in bed. Given sips of water. Pt speaking but words are not sensible. Harshal Lopes, RN    00:45  Pt alert and agitated after being turned. Refused Lorazepam, stating he was tired of us always trying to give that to him and he didn't want it anymore. Pt's verbal responses are clear, and appropriate to situation. After a few minutes pt settled down and agreed to take it. 07:35  Bedside shift change report given to Christopher Mccarty RN (oncoming nurse) by Harshal Lopes RN (offgoing nurse). No urine or stool output all shift. Report included the following information SBAR, Kardex, MAR and Recent Results.

## 2017-06-21 NOTE — HOSPICE
Pt arrived to room 1119 in no obvious distress, no family with pt. Pt attempting to open eyes but not verbally responsive. Dr Luci Osuna aware of respite admission, will hold po medications until pt is alert enough to take po safely. Respite day 1, discharge scheduled for Monday unless pt is too unstable to transport. [place] [facility]  [phone]    Respite Nursing Note   Patient Name: Daniella España  YOB: 1960  Age: 60 y.o. Date of Visit: 06/21/17  Facility of Care: [facility]  Patient Room: [address]     Hospice Attending: Elaine Barthel, MD  Hospice Diagnosis: Inclusion Body Myositis  Myositis    Level of Care: Respite    ASSESSMENT & PLAN     1. Patient admitted to Respite level of care due to:    [x] Caregiver Exhaustion: wife has decompensated as pt's restlessness has worsened. [x] Caregiver Breakdown: due to exhaustion  2.     3.      Nursing Interventions: maintain optimal level of comfort    Spiritual Interventions: home  dianne to follow pt in the hospital    Psych/ Social/ Emotional Interventions: wife not yet present at bedside, hopefully she will rest.    Care Coordination Needs: communication with home hospice team, hospital team    Care Plan and New Orders Discussed / Approved with Risa Stephens MD.    Description History and Chart Review     List number of doses of PRN medications in last 24 hours:  Medication 1:  Number of doses:    Medication 2:   Number of doses:    Medication 3:   Number of doses:    DISCHARGE PLANNING     1. Discharge Plan: home with hospice Monday after 5 days of respite  2. Patient/Family teaching: no family present  3.  Response to patient/family teaching:     ASSESSMENT    KARNOFSKY: 20%    FAST:      Prognosis estimated based on 06/21/17 clinical assessment is:   [] Few to Many Hours  [] Hours to Days   [] Few to Many Days   [x] Days to Weeks   [] Few to Many Weeks   [] Weeks to Months   [] Few to Many Months    Quality Measure: Patient self-reports:  []  Yes   [x]  No    ESAS: 15:00Time of Assessment:   Pain (1-10):0  Fatigue (1-10): 0  Shortness of breath (1-10):2  Nausea (1-10): Appetite (1-10): Anxiety: (1-10):   Depression: (1-10):   Well-being: (1-10):   Constipation: _ Yes  _ No    CLINICAL INFORMATION   No data found.       Currently this patient has:  [] Supplemental O2   [] IV    [] PICC      [] PORT   [] NG Tube    [] PEG Tube   [] Ostomy     [] Penny draining _______ urine  [] Other    SIGNS/PHYSICAL FINDINGS     Skin:  [] Warm, dry, supple, intact and color normal for race  [] Warm   [] Dry   [] Cool     [] Clammy       [] Diaphoretic    Turgor   [] Normal   [] Decreased  Color:   [] Pink   [] Pale   [] Cyanotic   [] Erythema   [] Jaundice   [] Normal for Race  []  Wounds:    Neuro:  [] Lethargy  [] Restlessness / agitation  [] Confusion / delirium  [] Hallucinations  [] Responds to maximal stimulation  [] Unresponsive  [] Seizures     Cardiac:  [] Dyspnea on Exertion  [] JVD  [] Murmur  [] Palpitations  [] Hypotension  [] Hypertension  [] Tachycardia  [] Bradycardia  [] Irregular HR  [] Pulses Decreased  [] Pulses Absent  [] Edema:       (Location, Grade and Pitting)  [] Mottling:      (Location)    Respiratory:  Breath sounds:    [] Diminished   [] Wheeze   [] Rhonchi   [] Rales   [] Even and unlabored  [] Labored:            [] Cough   [] Non Productive   [] Productive    [] Description:           [] Deep suctioned   [] O2 at ___ LPM  [] High flow oxygen greater than 10 LPM  [] Bi-Pap    GI:  [] Abdomen (describe)   [] Ascites  [] Nausea  [] Vomiting  [] Incontinent of bowels  [] Bowel sounds (yes/no)  [] Diarrhea  [] Constipation (see above including last bowel movement)  [] Checked for impaction  [] Last BM 6/20/17      Nutrition  Diet:__NPO________  Appetite:   [] Good   [] Fair   [] Poor   [] Tube Feeding     :  Home team attempted to place penny, will assess need  [] Voiding  [x] Incontinent   [] Genaro    Musculoskeletal  [] Balance/[place] Unsteady   [] Weak   Strength:    [] Normal    [] Limited    [x] Decreasing   Activities:    [] Up as tolerated   [x] Bedridden    [] Specify:    SAFETY  [x] 24 hr. Caregiver   [x] Side rails ? [x] Hospital bed   [x] Reviewed Falls & Safety     ALLERGIES AND MEDICATIONS     Allergies: Allergies   Allergen Reactions    Amoxicillin Nausea and Vomiting    Morphine Other (comments)     Pt states when he got the pill form of morphine, he \"couldn't move\"    3/3-Pt stated he is not allergic to morphine. He has requested to have liquids for increase shortness of breath. Per patient he does not have a true allergy he just did not know how to explain how the pills made him feel.         Current Facility-Administered Medications   Medication Dose Route Frequency    LORazepam (INTENSOL) 2 mg/mL oral concentrate 1 mg  1 mg SubLINGual Q1H PRN    acetaminophen (TYLENOL) suppository 650 mg  650 mg Rectal Q4H PRN          Visit Time In: 3:00  Visit Time Out: 4:00

## 2017-06-21 NOTE — PROGRESS NOTES
Primary Nurse Dotty Ugalde RN and Jez Calderon RN performed a dual skin assessment on this patient No impairment noted  Norm score is 12;

## 2017-06-21 NOTE — PROGRESS NOTES
Oncology Nursing Communication Tool      7:14 PM  6/21/2017     Bedside shift change report given to Harshal Lopes RN (incoming nurse) by Josiah Fonseca RN (outgoing nurse) on Akira Mosqueda a 60 y.o. male who was admitted on 6/21/2017  2:44 PM. Report included the following information SBAR and Kardex. Significant changes during shift: none      Issues for physician to address: none            Code Status: DNR     Infections: No current active infections     Allergies: Amoxicillin and Morphine     Current diet:         Pain Controlled [x] yes [] no   Bowel Movement [] yes [x] no   Last Bowel Movement (date) ? Vital Signs:   Patient Vitals for the past 12 hrs:   Temp Pulse Resp BP   06/21/17 1724 97.5 °F (36.4 °C) 96 17 92/68      Intake & Output:   No intake or output data in the 24 hours ending 06/21/17 1914   Laboratory Results:   No results found for this or any previous visit (from the past 12 hour(s)). Opportunity for questions and clarifications were given to the incoming nurse. Patient's bed is in low position, side rails x2, door open PRN, call bell within reach and patient not in distress.       Josiah Fonseca RN

## 2017-06-22 NOTE — HOSPICE
[facility] RN note:  Pt more alert today, able to open eyes, speaking but difficult to understand. Appears to be in pain, PRN dose of morphine 60mg sl given which is the equivalent dose of opioid used at home. Placed some of pt po medications which pt later refused. Checked in on pt to assess comfort post PRN morphine, found to be resting comfortably without restlessness or facial grimacing. At one point pt became agitated stating, \"leave me alone, please, just leave me alone. \"  Will continue to monitor level of comfort.    6:00pm----In to meet with pt and wife Rajesh Newsome who appears appropriately tearful and realistic about current clinical status. Pt remains comfortable post PRN dose of sl morphine. Wife states that pt prefers oxycodone in that it doesn't make him \"feel as out of it. \"  Changed morphine to sl oxycodone 60mg sl which is the dose pt was taking at home every 4 hrs. Wife is aware that pt is unable/unwilling to take po medications. Will continue sl ativan scheduled every 6 hrs, held x 1 per wife due to somnolence. Wife also states that pt is most comfortable on L side due to sciatica. Thank you for the opportunity to care for this pt and family. Please contact hospice at [phone] with any questions or concerns.

## 2017-06-22 NOTE — PROGRESS NOTES
Spiritual Care Partner Volunteer visited patient on 6/22/2017. Documented by:   Rev. Arcelia Ness.  Davey Hussein MA, [facility]    Lead  Profession Development & Advancement

## 2017-06-22 NOTE — WOUND CARE
Pressure Ulcer Prevention In basket Alert Received for Norm < 14 (moderate risk).      Suggested Care Plan/Interventions for Nursing  1. Complete Norm Pressure Ulcer Risk Scale and use sub scores to identify appropriate interventions. 2. Perform Assessment: skin, changes in LOC, visual cues for pain, monitor skin under medical devices  3. Respond to Reduced Sensory Perception: changes in LOC, check visual cues for pain, float heels, suspension boots, pressure redistribution bed/mattress/chair cushion, turning and reposition approximately every 2 hours (pillows & wedges), pad between skin to skin, turn & reposition  4. Manage Moisture: absorbent under pads, internal / external urinary device, internal /  external fecal device, minimize layers, contain wound drainage, access need for specialty bed, limit adult briefs, maintain skin hydration (lotion/cream), moisture barrier, offer toileting every hour  5. Promote Activity: increase time out of bed, chair cushion, PT/OT evaluation, trapeze to reposition, pressure redistribution bed/mattress/chair  6. Address Reduced Mobility: float heels / suspension boot, HOB 30 degrees or less, pressure redistribution bed/mattress/cushion, PT / OT evaluation, turn and reposition approximately every 2 hours (pillows & wedges)  7. Promote Nutrition: document food / fluid / supplement intake, encourage/assist with meals as needed  8. Reduce Friction and Shear: transferring/repositioning devices (lift/draw sheet), lift team/ patient mobility team, feet elevated on foot rest, minimize layers, foam dressing / transparent film / skin sealants, protective barrier creams and emollients, transfer aides (board, Tae lift, ceiling lift, stand assist), HOB 30 degrees or less, trapeze to reposition.   Wound Care Team

## 2017-06-22 NOTE — PROGRESS NOTES
Oncology Nursing Communication Tool      6:49 PM  6/22/2017     Bedside shift change report given to Abiodun Shukla RN (incoming nurse) by Jack Naranjo RN (outgoing nurse) on Chiquita Almendarez a 60 y.o. male who was admitted on 6/21/2017  2:44 PM. Report included the following information SBAR, Kardex, Procedure Summary, Intake/Output, MAR, Accordion, Recent Results and Med Rec Status. Significant changes during shift: PRN dose of roxanol given. Patient refused midday pills, held evening medications per patient's wife's request.       Issues for physician to address: None            Code Status: DNR     Infections: No current active infections     Allergies: Amoxicillin and Morphine     Current diet:         Pain Meds [x] yes [] no   Bowel Movement [] yes [x] no   Last Bowel Movement (date)                 Vital Signs:   Patient Vitals for the past 12 hrs:   Temp Pulse Resp BP SpO2   06/22/17 0458 98 °F (36.7 °C) (!) 101 17 (!) 82/67 90 %   06/21/17 2320 97.7 °F (36.5 °C) 81 13 (!) 85/60 91 %      Intake & Output:   No intake or output data in the 24 hours ending 06/22/17 1001   Laboratory Results:   No results found for this or any previous visit (from the past 12 hour(s)). Opportunity for questions and clarifications were given to the incoming nurse. Patient's bed is in low position, side rails x2, door open PRN, call bell within reach and patient not in distress.       Jack Naranjo RN

## 2017-06-22 NOTE — H&P
[facility] Those in Need  [phone]    Patient Name: Brandon Kang  YOB: 1960    Date of Provider Hospice Visit: 06/21/17    Level of Care:   [] General Inpatient (GIP)    [] Routine   [x] Respite    Location of Care:  [] [facility] [] [facility] [x] [facility] [] [facility] [] [facility]    Date of [facility] Admission: 10-18-16  Hospice Medical Director for Inpatient Care: Dr. Derik Beck Diagnosis: inclusion body myositis  Diagnoses RELATED to the terminal prognosis: CHF ef 20%  Other Diagnoses: PAF,pulm HTN. PAF     HOSPICE SUMMARY   Do not cut and paste chart information other than imaging findings    Brandon Kang is a 60y.o. year old who was admitted to [address] [place] [address]. The patient's principle diagnosis has resulted in progressive weakness and pain requiring methadone and oxycodone to manage pain   Refer to LCD     Functionally, the patient's Karnofsky and/or Palliative Performance Scale has declined over a period of days and is estimated at 20. The patient is dependent on the following ADLs: all    Objective information that support this patients limited prognosis includes: pt is lethargic , minimally responisve    The patient/family chose comfort measures with the support of Hospice. HOSPICE DIAGNOSES   Active Symptoms:  1. Terminal restlessness  2. Pain     PLAN       1. Admitted respite at [facility] for respite d/t CG breakdown. Pt has had decline at home and more restless, wife exhausted. Has not been able to take most medicines last few days d/t mental status change  2. May need to restart diaudid pca 1mg/hr if unable to take oral medicines tomorrow  3.  and SW to support family needs  4. Disposition: Return to home with hospice in 5 days if stable    Prognosis estimated based on 06/21/17 clinical assessment is:   [] Hours to Days    [x] Days to Weeks    [] Other:    Communicated plan of care with: Hospice Case Manager;  Hospice IDT; Care Team     GOALS OF CARE     Resuscitation Status: DNR  Durable DNR: [] Yes [] No    Advance Care Planning 12/3/2016   Patient's Healthcare Decision Maker is: Named in scanned ACP document   Primary Decision Maker Name Tiago Ragland   Primary Decision Maker Phone Number [phone]   Primary Decision Maker Relationship to Patient Spouse   Confirm Advance Directive Yes, on file   Does the patient have other document types -        HISTORY     History obtained from: chart and hospice RN    CHIEF COMPLAINT: pain  The patient is:   [x] Verbal  [] Nonverbal  [] Unresponsive    HPI/SUBJECTIVE:    Has been lethargic today, home medicines held today  Had stopped taking most home medicines a few days ago but still taking oxycodone 60 mg q4        REVIEW OF SYSTEMS     The following systems were: [] reviewed  [x] unable to be reviewed    Positive ROS include:  Constitutional:  Ears/nose/mouth/throat:  Respiratory:  Gastrointestinal:  Musculoskeletal:  Neurologic:  Psychiatric:  Endocrine:     Adult Non-Verbal Pain Assessment Score: 5  Face  [] 0   No particular expression or smile  [] 1   Occasional grimace, tearing, frowning, wrinkled forehead  [x] 2   Frequent grimace, tearing, frowning, wrinkled forehead    Activity (movement)  [] 0   Lying quietly, normal position  [] 1   Seeking attention through movement or slow, cautious movement  [x] 2   Restless, excessive activity and/or withdrawal reflexes    Guarding  [] 0   Lying quietly, no positioning of hands over areas of body  [x] 1   Splinting areas of the body, tense  [] 2   Rigid, stiff    Physiology (vital signs)  [x] 0   Stable vital signs  [] 1   Change in any of the following: SBP > 20mm Hg; HR > 20/minute  [] 2   Change in any of the following: SBP > 30mm Hg; HR > 25/minute    Respiratory  [x] 0   Baseline RR/SpO2, compliant with ventilator  [] 1   RR > 10 above baseline, or 5% drop SpO2, mild asynchrony with ventilator  [] 2   RR > 20 above baseline, or 10% drop SpO2, asynchrony with ventilator     FUNCTIONAL ASSESSMENT     Palliative Performance Scale (PPS):20     PSYCHOSOCIAL/SPIRITUAL ASSESSMENT     Active Problems:    Myositis (6/21/2017)      Past Medical History:   Diagnosis Date    [facility]immune disease ([email or web address]     MD    CAD (coronary artery disease) 2012    CH[facility]congestive [facility] failure) [facility])     Gastrointestinal disorder     Hypertension     Muscular dystrophy ([facility] [facility]     Neurological disorder     Inculsion body myocitis    Sleep disorder     Unspecified sleep apnea       Past Surgical History:   Procedure Laterality Date    CARDIAC SURG PROCEDURE UNLIST      stent cardiac cath    HX ORTHOPAEDIC      right knee    HX PACEMAKER      MN COLONOSCOPY FLX DX W/COLLJ SPEC WHEN PFRMD  12/12/2013         MN ESOPHAGOGASTRODUODENOSCOPY TRANSORAL DIAGNOSTIC  12/12/2013           Social History   Substance Use Topics    Smoking status: Former Smoker     Packs/day: 1.00     Years: 25.00     Types: Cigarettes    Smokeless tobacco: Never Used      Comment: trying to quit    Alcohol use No     Family History   Problem Relation Age of Onset    Heart Disease Other     Hypertension Father       Allergies   Allergen Reactions    Amoxicillin Nausea and Vomiting    Morphine Other (comments)     Pt states when he got the pill form of morphine, he \"couldn't move\"    3/3-Pt stated he is not allergic to morphine. He has requested to have liquids for increase shortness of breath. Per patient he does not have a true allergy he just did not know how to explain how the pills made him feel.        Current Facility-Administered Medications   Medication Dose Route Frequency    LORazepam (INTENSOL) 2 mg/mL oral concentrate 1 mg  1 mg SubLINGual Q1H PRN    acetaminophen (TYLENOL) suppository 650 mg  650 mg Rectal Q4H PRN    morphine (ROXANOL) 100 mg/5 mL (20 mg/mL) concentrated solution 10 mg  10 mg SubLINGual Q15MIN PRN    LORazepam (INTENSOL) 2 mg/mL oral concentrate 0.5 mg  0.5 mg SubLINGual Q6H        PHYSICAL EXAM     Wt Readings from Last 3 Encounters:   01/04/17 190 lb (86.2 kg)   12/07/16 190 lb (86.2 kg)   10/18/16 195 lb (88.5 kg)       Visit Vitals    BP (!) 85/60 (BP 1 Location: Left arm, BP Patient Position: At rest)    Pulse 81    Temp 97.7 °F (36.5 °C)    Resp 13    SpO2 91%       Supplemental O2  [] Yes  [x] NO  Last bowel movement:     Currently this patient has:  [] Peripheral IV [] PICC  [] PORT [] ICD    [] Lam Catheter [] NG Tube   [] PEG Tube    [] Rectal Tube [] Drain  [] Other:     Constitutional: pt appears weak and is lethargic, mumbles a few words, unable to respond appropriatly or answer questions consistently.  Weak, emaciated, resting in bed  Eyes: perrl  ENMT: curtis-moist  Cardiovascular: rrr  Respiratory: clear  Gastrointestinal: soft  Musculoskeletal:moves all 4  Skin: warm, dry  Neurologic: lethargic  Psychiatric:   Other:    Pertinent Lab and or Imaging Tests:  Lab Results   Component Value Date/Time    Sodium 140 09/30/2016 04:09 AM    Potassium 4.2 09/30/2016 04:09 AM    Chloride 104 09/30/2016 04:09 AM    CO2 30 09/30/2016 04:09 AM    Anion gap 6 09/30/2016 04:09 AM    Glucose 110 09/30/2016 04:09 AM    BUN 9 09/30/2016 04:09 AM    Creatinine 0.54 09/30/2016 04:09 AM    BUN/Creatinine ratio 17 09/30/2016 04:09 AM    GFR est AA >60 09/30/2016 04:09 AM    GFR est non-AA >60 09/30/2016 04:09 AM    Calcium 9.2 09/30/2016 04:09 AM     Lab Results   Component Value Date/Time    Protein, total 7.4 09/28/2016 07:06 AM    Albumin 3.4 09/28/2016 07:06 AM           Total time: 40min  Counseling / coordination time:   > 50% counseling / coordination?:

## 2017-06-23 NOTE — PROGRESS NOTES
Spiritual Care Assessment/Progress Notes    Patricia Meek [id number]  [id number]    1960  60 y.o.  male    Patient Telephone Number: [phone] (home)   Pentecostalism Affiliation: [place]   Language: [place]   Extended Emergency Contact Information  Primary Emergency Contact: Genaro Scott  Address: [address] [place] [address], [address] Phone: [phone]  Mobile Phone: [phone]  Relation: Spouse   Patient Active Problem List    Diagnosis Date Noted    Myositis 06/21/2017    Chest pain at rest 09/28/2016    Advance care planning 05/20/2016    Chronic pain syndrome 08/12/2015    Spinal stenosis in cervical region 08/12/2015    Spinal stenosis, thoracic 08/12/2015    Spinal stenosis, lumbar 08/12/2015    Abdominal distention 05/21/2014    Lesion of ulnar nerve 04/26/2014    Carpal tunnel syndrome 04/26/2014    Lumbar radiculopathy 78/70/5814    Acute systolic congestive heart failure, NYHA class 4 ([address] 12/08/2013    CHF (congestive heart failure) ([facility] [address] [facility] 10/28/2013    Inclusion body myositis 06/14/2013    Chronic pain disorder 06/14/2013    Ischemic heart disease due to coronary artery obstruction ([facility] [address] [facility] 06/14/2013    Cervical radiculopathy 06/14/2013    Back pain 06/14/2013    Other pulmonary embolism and infarction 03/29/2011    Other chest pain 03/29/2011    Essential hypertension, benign 03/29/2011    Unspecified hyperplasia of prostate without urinary obstruction and other lower urinary tract symptoms (LUTS) 03/29/2011    Tobacco abuse 03/29/2011    Pain, dental 03/29/2011        Date: 6/23/2017       Level of Pentecostalism/Spiritual Activity:  []         Involved in mahesh tradition/spiritual practice    []         Not involved in mahesh tradition/spiritual practice  []         Spiritually oriented    []         Claims no spiritual orientation    []         seeking spiritual identity  []         Feels alienated from Christian practice/tradition  []         Feels angry about Anabaptist practice/tradition  []         Spirituality/Anabaptist tradition   a resource for coping at this time. [x]         Not able to assess due to medical condition    Services Provided Today:  []         crisis intervention    []         reading Scriptures  []         spiritual assessment    []         prayer  []         empathic listening/emotional support  []         rites and rituals (cite in comments)  []         life review     []         Anabaptist support  []         theological development   []         advocacy  []         ethical dialog     []         blessing  []         bereavement support    []         support to family  []         anticipatory grief support   []         help with AMD  []         spiritual guidance    []         meditation      Spiritual Care Needs  []         Emotional Support  []         Spiritual/Baptism Care  []         Loss/Adjustment  []         Advocacy/Referral                /Ethics  []         No needs expressed at               this time  []         Other: (note in               comments)  [address]  []         Follow up visits with               pt/family  []         Provide materials  []         Schedule sacraments  []         Contact Community               Clergy  []         Follow up as needed  []         Other: (note in               comments)     Comments:  Responded to page to Oncology when patient . Family is en route.  will return.   CARINA Calvin, [facility], [address] [place] [address] [address]     Paging Service  [phone])

## 2017-06-23 NOTE — HOSPICE
MSW made PRN visit that turned into death visit to see patient in respite. Patient unresponsive when MSW entered room, appeared to be resting comfortably. MSW spoke with Hospital RN Marilee Lu who reported that patient had been medicated just before MSW arrived as he was having some agitation. MSW followed up with Hospice Liaison who reported that she would go check in on patient later in the morning. As MSW was going to leave PAUL Lu informed that patient had passed and wife was on the phone checking on him. MSW talked with wife and informed that patient may have passed and that she should come down. MSW offered to go to patient's home to provide support, wife stated that she wanted to come to the hospital to see patient. Wife also stated that she would call her sister for support. Patient's wife arrived along with her sister and niece. Family very distraught at patient's passing. Wife Cam Labrum next to patient and held him, talking to him, crying. Wife struggling to cope with passing, not accepting that patient had passed, stated that she can hear him breathing. MSW provided emotional support and reassurance that wife had taken excellent care of patient in the home and assured her that he was no longer in pain. MSW also reassured wife that she did everything she could to keep patient comfortable and the decision for respite was nothing for her to feel guilty about. Wife continually apologizing to patient for not being with him. MSW talked with wife how it is not uncommon for patient's to wait to be alone to pass and that taking some time to care for herself was not something she should feel bad about. MSW left room so that family could grieve and be with patient. Wife's sister very tearful, but calmed down and asked MSW about next steps. MSW informed that patient and wife had never wanted to make final arrangements, but that wife would need to make a decision today.  MSW assured wife's sister that there was no time limit for family to be with patient, and that they could inform us of  home when they are ready. Sister informed that patient's family was on the way. Sister concerned about wife as she would not leave patient's side and kept stating that she wanted patient to wake up. MSW went back into room and provided emotional support to wife through reassurance. MSW called patient's home hospice chaplain for additional support. MSW left, but to return for additional support.      MERCEDEZ Lincoln  [facility]  Time in: 9:50am  Time out: 11:45am

## 2017-06-23 NOTE — HOSPICE
MSW made visit to check on wife of patient. Patient's family present in hospital providing support to each other. Hospice Chaplain and liaison informed that family is now calmer, but have needed a lot of support with patient's death. MSW spoke with wife's two sisters who report that they are trying to keep her calm and worried about her laurel fragile. Sisters want her to be able to make final arrangements for patient without patient's mother. MSW walked with sisters to get wife who was now sitting in common area, and walked back to patient's room. Wife sat at bedside and talked to patient, still very sad and tearful. MSW and older sister left room, other sister stayed with wife to talk about arrangements. Sister informed that wife has chosen [employer]. MSW informed that she will inform RN and that family can take their time in saying goodbye. Family very appreciative of care and support that has been provided to patient and family. Family still very sad and tearful, but appeared to be coping appropriately when MSW left them. MSW will follow up with wife and continue to provide support as accepted.     MERCEDEZ Melgar  [facility]  Time in: 1:50pm  Time out: 3:15pm

## 2017-06-23 NOTE — HOSPICE
[facility] RN death visit note. Called to room by patient's home MSW Neli Granados as patient had passed. Dean Fink advised wife via phone and will remain until wife arrives. Dr. Dudley Palacios advised. Unit nurse calling MD to pronounce. Thank you for helping us care for this gentleman and his family.     Shayla Alvarez RN [facility]

## 2017-06-23 NOTE — HOSPICE
Hospice chaplain visited family in response to patient's death after being notified by hospice Phillip NEWSOME. Wife and family were present at the bedside. Wife was extremely distraught and tearful as she held on to her beloved. Mrs. Solis whispered to the patient that she was sorry and was asking him to come back.  provide family the space to express their grief and mourning as they are now bereaved. The patient's S-I-L inquired of this  as to what the next steps were.  shared with her that there would be no rush from them to leave; however,  arrangements need to be explored and established.  offered words of assurance.  to follow-up as able and/or needed.       Sameera Katz, [place] [address] [address], [facility] [address]   W: [phone]  F: [phone]   [email or web address]   [facility] to Those in [facility]

## 2017-06-23 NOTE — PROGRESS NOTES
Entered patient's room and patient laying in bed with no noted respirations. Unable to palpate a pulse. Hospice nurse notified. Dr. Osvaldo Mckeon E paged.

## 2017-06-23 NOTE — PROGRESS NOTES
Problem: Pressure Injury - Risk of  Goal: *Prevention of pressure ulcer  Outcome: Resolved/Met Date Met:  06/23/17  No pressure areas noted

## 2017-06-23 NOTE — PROGRESS NOTES
Supportive visit on Oncology unit to offer grief support to patient's wife, S-I-L, and one other visitor. Mrs. Solis was very tearful as she embraced her . Offered words of condolence. Offer of prayer was declined, bur reassurance of keeping patient and family in prayer was accepted.     CARINA Murrieta, [facility], [address] [place] [address] [address]     Paging Service  [phone])

## 2017-06-23 NOTE — PROGRESS NOTES
Called by nursing staff at [phone] AM  This was an expected   death. Patient was DNR/DNI with no esclation of care, therefore, no additional   resuscitative measures were performed. Patient was correctly identified. Patient was not hypothermic at this time. Exam:  -  no palpable pulses  - No spontaneous movements were present. There was no response to verbal or   tactile stimuli. Pupils were mid-dilated and fixed without a corneal reflex. No   breath sounds were appreciated over either lung fields x 1 minute. No carotid   pulses were palpable. No heart sounds were auscultated over the entire   precordium x 1 minute. Patient pronounced dead on 6/23/17 at [phone] am  and was   witnessed by nurse. - Nursing to prepare body for morgue and contact the morgue for appropriate   transfer  - -autopsy per primary team.  -primary team to do final DC notes.

## 2017-06-23 NOTE — PROGRESS NOTES
Family at bedside. Wife having a difficult time with husbands passing away. The wife laying over the patient stating \"He is just snoring I hear him making noise\"  The  at bedside consoling the wife.

## 2017-06-27 NOTE — DISCHARGE SUMMARY
Hospice Discharge Summary    [facility]  Good Help to Those in Need        Date of Admission: 6/21/2017  Date of Discharge: 6/23/2017    Zada Najjar is a 60y.o. year old who was [facility] with a Hospice diagnosis of Inclusion Body Myositis;Myositis. The patient's care was focused on comfort and the patient passed away on 6/23/2017.     Add in Hospice Summary through Plan from most recent Progress Note

## 2017-07-03 ENCOUNTER — HOME CARE VISIT (OUTPATIENT)
Dept: HOSPICE | Facility: HOSPICE | Age: 57
End: 2017-07-03
Payer: MEDICARE

## 2018-07-25 ENCOUNTER — NURSE NAVIGATOR (OUTPATIENT)
Dept: PALLATIVE CARE | Age: 58
End: 2018-07-25

## 2018-08-01 NOTE — PROGRESS NOTES
[employer] Palliative Medicine Office  Nursing Note  [phone]  Fax [phone]     Name:  Nadira Barrett  YOB: 1960       Pt's wife Katherine Cobb brought forms into Dr. Tushar Escobar office requesting a signature on insurance forms. Dr. Monet Claire was [facility] attending in [facility] prior to his death on 6/23/17. 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 Ms. Karla Doan has a Cancer and ICU Claim form from [address] [place] [address] that she is requesting Dr. Monet Claire sign. Wife gave this nurse permission to review chart. Nurse will give forms to Dr. Monet Claire to review. Ms. Karla Doan requests that nurse call her and she will  the forms rather than mailing them to her.     Jesus Elam, ALFREDON, RN  Clinical Referral Navigator

## 2018-08-08 ENCOUNTER — NURSE NAVIGATOR (OUTPATIENT)
Dept: PALLATIVE CARE | Age: 58
End: 2018-08-08
